# Patient Record
Sex: FEMALE | Race: WHITE | NOT HISPANIC OR LATINO | Employment: FULL TIME | URBAN - METROPOLITAN AREA
[De-identification: names, ages, dates, MRNs, and addresses within clinical notes are randomized per-mention and may not be internally consistent; named-entity substitution may affect disease eponyms.]

---

## 2021-02-02 ENCOUNTER — OFFICE VISIT (OUTPATIENT)
Dept: FAMILY MEDICINE | Facility: CLINIC | Age: 36
End: 2021-02-02

## 2021-02-02 VITALS
WEIGHT: 173 LBS | BODY MASS INDEX: 28.82 KG/M2 | SYSTOLIC BLOOD PRESSURE: 102 MMHG | OXYGEN SATURATION: 97 % | TEMPERATURE: 98.2 F | DIASTOLIC BLOOD PRESSURE: 68 MMHG | HEIGHT: 65 IN | HEART RATE: 76 BPM

## 2021-02-02 DIAGNOSIS — Z71.89 ACP (ADVANCE CARE PLANNING): ICD-10-CM

## 2021-02-02 DIAGNOSIS — Z01.818 PRE-OP EXAM: Primary | ICD-10-CM

## 2021-02-02 DIAGNOSIS — M67.431 GANGLION CYST OF WRIST, RIGHT: ICD-10-CM

## 2021-02-02 DIAGNOSIS — Z76.89 HEALTH CARE HOME: ICD-10-CM

## 2021-02-02 DIAGNOSIS — D75.839 THROMBOCYTOSIS: ICD-10-CM

## 2021-02-02 DIAGNOSIS — L70.9 ACNE, UNSPECIFIED ACNE TYPE: ICD-10-CM

## 2021-02-02 DIAGNOSIS — D72.829 LEUKOCYTOSIS, UNSPECIFIED TYPE: ICD-10-CM

## 2021-02-02 LAB
BUN SERPL-MCNC: 14 MG/DL (ref 7–25)
BUN/CREATININE RATIO: 15.9 (ref 6–22)
CALCIUM SERPL-MCNC: 9.7 MG/DL (ref 8.6–10.3)
CHLORIDE SERPLBLD-SCNC: 105.2 MMOL/L (ref 98–110)
CO2 SERPL-SCNC: 27.5 MMOL/L (ref 20–32)
CREAT SERPL-MCNC: 0.88 MG/DL (ref 0.6–1.3)
ERYTHROCYTE [DISTWIDTH] IN BLOOD BY AUTOMATED COUNT: 11.5 %
GLUCOSE SERPL-MCNC: 82 MG/DL (ref 60–99)
HCT VFR BLD AUTO: 39.3 % (ref 35–47)
HEMOGLOBIN: 13.2 G/DL (ref 11.7–15.7)
MCH RBC QN AUTO: 30.1 PG (ref 26–33)
MCHC RBC AUTO-ENTMCNC: 33.6 G/DL (ref 31–36)
MCV RBC AUTO: 89.6 FL (ref 78–100)
PLATELET COUNT - QUEST: 427 10^9/L (ref 150–375)
POTASSIUM SERPL-SCNC: 4.18 MMOL/L (ref 3.5–5.3)
RBC # BLD AUTO: 4.39 10*12/L (ref 3.8–5.2)
SODIUM SERPL-SCNC: 142.9 MMOL/L (ref 135–146)
WBC # BLD AUTO: 13.5 10*9/L (ref 4–11)

## 2021-02-02 PROCEDURE — 85027 COMPLETE CBC AUTOMATED: CPT | Performed by: PHYSICIAN ASSISTANT

## 2021-02-02 PROCEDURE — 99204 OFFICE O/P NEW MOD 45 MIN: CPT | Performed by: PHYSICIAN ASSISTANT

## 2021-02-02 PROCEDURE — 80048 BASIC METABOLIC PNL TOTAL CA: CPT | Performed by: PHYSICIAN ASSISTANT

## 2021-02-02 PROCEDURE — 36415 COLL VENOUS BLD VENIPUNCTURE: CPT | Performed by: PHYSICIAN ASSISTANT

## 2021-02-02 RX ORDER — ETONOGESTREL/ETHINYL ESTRADIOL .12-.015MG
RING, VAGINAL VAGINAL
COMMUNITY
Start: 2020-12-29 | End: 2021-04-14

## 2021-02-02 RX ORDER — SPIRONOLACTONE 50 MG/1
TABLET, FILM COATED ORAL
COMMUNITY
Start: 2021-01-19 | End: 2022-04-19

## 2021-02-02 SDOH — HEALTH STABILITY: MENTAL HEALTH: HOW MANY STANDARD DRINKS CONTAINING ALCOHOL DO YOU HAVE ON A TYPICAL DAY?: 1 OR 2

## 2021-02-02 SDOH — HEALTH STABILITY: MENTAL HEALTH: HOW OFTEN DO YOU HAVE A DRINK CONTAINING ALCOHOL?: 2-3 TIMES A WEEK

## 2021-02-02 SDOH — HEALTH STABILITY: MENTAL HEALTH: HOW OFTEN DO YOU HAVE 6 OR MORE DRINKS ON ONE OCCASION?: LESS THAN MONTHLY

## 2021-02-02 ASSESSMENT — MIFFLIN-ST. JEOR: SCORE: 1480.6

## 2021-02-02 NOTE — LETTER
[facility]  [address] [place] [address]  [address]  [facility] [address]  Phone: [phone]  Fax: [phone]  Primary Provider: Muna Walter  Pre-op Performing Provider: MUNA WALTER    PREOPERATIVE EVALUATION:  Today's date: 2/2/2021    Lynette Condon is a 35 year old female who presents for a preoperative evaluation.    Surgical Information:  Surgery/Procedure: Cystectomy in right wrist  Surgery Location: [facility]  Surgeon: Dr. Zavala  Surgery Date: 2/16/2021  Time of Surgery: TBD  Where patient plans to recover: At home alone  Fax number for surgical facility: [phone]    Type of Anesthesia Anticipated: to be determined    Subjective     HPI related to upcoming procedure:     1. No - Have you ever had a heart attack or stroke?  2. No - Have you ever had surgery on your heart or blood vessels, such as a stent, coronary (heart) bypass, or surgery on an artery in the head, neck, heart, or legs?  3. No - Do you have chest pain when you are physically active?  4. No - Do you have a history of heart failure?  5. No - Do you currently have a cold, bronchitis, or symptoms of other respiratory (head and chest) infections?  6. No - Do you have a cough, shortness of breath, or wheezing?  7. No - Do you or anyone in your family have a history of blood clots?  8. No - Do you or anyone in your family have a serious bleeding problem, such as long-lasting bleeding after surgeries or cuts?  9. No - Have you ever had anemia or been told to take iron pills?  10. No - Have you had any abnormal blood loss such as black, tarry or bloody stools, or abnormal vaginal bleeding?  11. No - Have you ever had a blood transfusion?  12. Yes - Are you willing to have a blood transfusion if it is medically needed before, during, or after your surgery?  13. No - Have you or anyone in your family ever had problems with anesthesia (sedation for surgery)?  14. No - Do you have sleep apnea,  excessive snoring, or daytime drowsiness?   15. No - Do you have any artifical heart valves or other implanted medical devices, such as a pacemaker, defibrillator, or continuous glucose monitor?  16. No - Do you have any artifical joints?  17. No - Are you allergic to latex?  18. No - Is there any chance that you may be pregnant?    Health Care Directive:  Patient does not have a Health Care Directive or Living Will: Discussed advance care planning with patient; information given to patient to review.    Status of Chronic Conditions:  See problem list for active medical problems.  Problems all longstanding and stable, except as noted/documented.  See ROS for pertinent symptoms related to these conditions.      Review of Systems  Constitutional, neuro, ENT, endocrine, pulmonary, cardiac, gastrointestinal, genitourinary, musculoskeletal, integument and psychiatric systems are negative, except as otherwise noted.    Patient Active Problem List    Diagnosis Date Noted     Health Care Home 02/02/2021     Priority: Low     ACP (advance care planning) 02/02/2021     Priority: Low      No past medical history on file.  Past Surgical History:   Procedure Laterality Date     LASIK Bilateral     age 21     OTHER SURGICAL HISTORY      egg harvesting x 2 for donation     PHOTOREFRACTIVE KERATECTOMY Bilateral     age 26     Current Outpatient Medications   Medication Sig Dispense Refill     NUVARING 0.12-0.015 MG/24HR vaginal ring PLACE 1 RING AND THEN REMOVE AFTER 21 DAYS THEN PLACE NEW RING AFTER 7 DAYS OUT       spironolactone (ALDACTONE) 50 MG tablet TAKE 1 TABLET BY MOUTH TWICE A DAY         No Known Allergies     Social History     Tobacco Use     Smoking status: Never Smoker     Smokeless tobacco: Never Used   Substance Use Topics     Alcohol use: Yes     Frequency: 2-3 times a week     Drinks per session: 1 or 2     Binge frequency: Less than monthly     Family History   Problem Relation Age of Onset     Hypertension  "Mother      Hypertension Father      Hypertension Brother      Breast Cancer Maternal Grandmother         70s     Prostate Cancer Maternal Grandfather      Breast Cancer Paternal Grandmother      History   Drug Use Unknown         Objective     /68 (BP Location: Left arm, Patient Position: Sitting, Cuff Size: Adult Large)   Pulse 76   Temp 98.2  F (36.8  C) (Oral)   Ht 1.651 m (5' 5\")   Wt 78.5 kg (173 lb)   LMP 01/07/2021   SpO2 97%   BMI 28.79 kg/m      Physical Exam    GENERAL APPEARANCE: healthy, alert and no distress     EYES: EOMI, PERRL     HENT: ear canals and TM's normal and nose and mouth without ulcers or lesions     NECK: no adenopathy, no asymmetry, masses, or scars and thyroid normal to palpation     RESP: lungs clear to auscultation - no rales, rhonchi or wheezes     CV: regular rates and rhythm, normal S1 S2, no S3 or S4 and no murmur, click or rub     ABDOMEN:  soft, nontender, no HSM or masses and bowel sounds normal     MS: extremities normal- no gross deformities noted, no evidence of inflammation in joints, FROM in all extremities.     SKIN: no suspicious lesions or rashes     NEURO: Normal strength and tone, sensory exam grossly normal, mentation intact and speech normal     PSYCH: mentation appears normal. and affect normal/bright     LYMPHATICS: No cervical adenopathy    No results for input(s): HGB, PLT, INR, NA, POTASSIUM, CR, A1C in the last 61961 hours.     Diagnostics:  Recent Results (from the past 240 hour(s))   Hemogram Platelet (BFP)    Collection Time: 02/02/21  4:53 PM   Result Value Ref Range    WBC 13.5 (A) 4.0 - 11 10*9/L    RBC Count 4.39 3.8 - 5.2 10*12/L    Hemoglobin 13.2 11.7 - 15.7 g/dL    Hematocrit 39.3 35.0 - 47.0 %    MCV 89.6 78 - 100 fL    MCH 30.1 26 - 33 pg    MCHC 33.6 31 - 36 g/dL    RDW 11.5 %    Platelet Count 427 (A) 150 - 375 10^9/L   Basic Metabolic Panel (BFP)    Collection Time: 02/02/21  5:13 PM   Result Value Ref Range    Carbon Dioxide 27.5 " 20 - 32 mmol/L    Creatinine 0.88 0.60 - 1.30 mg/dL    Glucose 82 60 - 99 mg/dL    Sodium 142.9 135 - 146 mmol/L    Potassium 4.18 3.5 - 5.3 mmol/L    Chloride 105.2 98 - 110 mmol/L    Urea Nitrogen 14 7 - 25 mg/dL    Calcium 9.7 8.6 - 10.3 mg/dL    BUN/Creatinine Ratio 15.9 6 - 22   HEMOGRAM PLATELET DIFF (BFP)    Collection Time: 02/09/21  4:34 PM   Result Value Ref Range    WBC 12.8 (A) 4.0 - 11 10*9/L    RBC Count 4.37 3.8 - 5.2 10*12/L    Hemoglobin 13.3 11.7 - 15.7 g/dL    Hematocrit 39.2 35.0 - 47.0 %    MCV 89.8 78 - 100 fL    MCH 30.4 26 - 33 pg    MCHC 33.9 31 - 36 g/dL    Platelet Count 405 (A) 150 - 375 10^9/L    % Granulocytes 54.6 %    % Lymphocytes 37.1 %    % Monocytes 8.3 %        Revised Cardiac Risk Index (RCRI):  The patient has the following serious cardiovascular risks for perioperative complications:   - No serious cardiac risks = 0 points     RCRI Interpretation: 0 points: Class I (very low risk - 0.4% complication rate)      Assessment & Plan     The proposed surgical procedure is considered INTERMEDIATE risk.    Pre-op exam  Ganglion cyst of wrist, right  - VENOUS COLLECTION  - Hemogram Platelet (BFP)  - Basic Metabolic Panel (BFP)  - VENOUS COLLECTION; Standing  - HEMOGRAM PLATELET DIFF (BFP); Standing  1. Seven days before surgery do not take Aspirin or any over-the-counter pain medications other than Tylenol.  TYLENOL is the safest pain pill to use before surgery because it does not affect your bleeding time. If tylenol is not sufficient for pain control talk to me or the surgeon and we will decide what is safe to use.    2. Do not eat anything after midnight  (lorena of the surgery) and nothing the morning of the surgery.    3. Medications: Hold medication on day of surgery until after surgery.     4. Follow all instructions given by the surgery team. They usually give out a packet. Read it and please follow it precisely. This helps surgical experience and outcomes.    5. If you have any  questions do not hesitate to call me or the surgeon/surgical team.    Acne, unspecified acne type    Elevated WBCs, platelets  Known history of this. Improved when rechecked on 1 week interval.     Health Care Home    ACP (advance care planning)    Risks and Recommendations:  The patient has the following additional risks and recommendations for perioperative complications:   - No identified additional risk factors other than previously addressed      RECOMMENDATION:  APPROVAL GIVEN to proceed with proposed procedure, without further diagnostic evaluation.    25 minutes spent on the date of the encounter doing chart review and patient visit     Signed Electronically by: Muna Walter PA-C    Copy of this evaluation report is provided to requesting physician.    Preop [facility] Preop Guidelines    Revised Cardiac Risk Index

## 2021-02-02 NOTE — LETTER
[facility]  [address] [place]  [address]  [facility] [address]  [phone]      February 2, 2021      Lynette Condon  [address]  [facility] [address]      EMERGENCY CARE PLAN  Presenting Problem Treatment Plan   Questions or concerns during clinic hours I will call the clinic directly:    [facility]  [address] , [address]  [place] [address]  [phone]   Questions or concerns outside clinic hours  I will call the 24 hour line at [phone]   Patient needs to schedule an appointment  I will call the  scheduling line at [phone]   Same day treatment   I will call the clinic first, then  urgent care and/or  express care if needed   Clinic Care Coordinators Dorene Chaudhary RN:  [phone]  [facility] Clinical Support Staff: [phone]    Crisis Services:  Behavioral or Mental Health [facility] ([facility] Providers)   [phone]   Emergency treatment--Immediately CALL 051

## 2021-02-02 NOTE — PROGRESS NOTES
[facility]  [address] [place] [address]  [address]  [facility] [address]  Phone: [phone]  Fax: [phone]  Primary Provider: Muna Walter  Pre-op Performing Provider: MUNA WALTER    PREOPERATIVE EVALUATION:  Today's date: 2/2/2021    Lynette Condon is a 35 year old female who presents for a preoperative evaluation.    Surgical Information:  Surgery/Procedure: Cystectomy in right wrist  Surgery Location: [facility]  Surgeon: Dr. Zavala  Surgery Date: 2/16/2021  Time of Surgery: TBD  Where patient plans to recover: At home alone  Fax number for surgical facility: [phone]    Type of Anesthesia Anticipated: to be determined    Subjective     HPI related to upcoming procedure:     1. No - Have you ever had a heart attack or stroke?  2. No - Have you ever had surgery on your heart or blood vessels, such as a stent, coronary (heart) bypass, or surgery on an artery in the head, neck, heart, or legs?  3. No - Do you have chest pain when you are physically active?  4. No - Do you have a history of heart failure?  5. No - Do you currently have a cold, bronchitis, or symptoms of other respiratory (head and chest) infections?  6. No - Do you have a cough, shortness of breath, or wheezing?  7. No - Do you or anyone in your family have a history of blood clots?  8. No - Do you or anyone in your family have a serious bleeding problem, such as long-lasting bleeding after surgeries or cuts?  9. No - Have you ever had anemia or been told to take iron pills?  10. No - Have you had any abnormal blood loss such as black, tarry or bloody stools, or abnormal vaginal bleeding?  11. No - Have you ever had a blood transfusion?  12. Yes - Are you willing to have a blood transfusion if it is medically needed before, during, or after your surgery?  13. No - Have you or anyone in your family ever had problems with anesthesia (sedation for surgery)?  14. No - Do you have sleep apnea,  excessive snoring, or daytime drowsiness?   15. No - Do you have any artifical heart valves or other implanted medical devices, such as a pacemaker, defibrillator, or continuous glucose monitor?  16. No - Do you have any artifical joints?  17. No - Are you allergic to latex?  18. No - Is there any chance that you may be pregnant?    Health Care Directive:  Patient does not have a Health Care Directive or Living Will: Discussed advance care planning with patient; information given to patient to review.    Status of Chronic Conditions:  See problem list for active medical problems.  Problems all longstanding and stable, except as noted/documented.  See ROS for pertinent symptoms related to these conditions.      Review of Systems  Constitutional, neuro, ENT, endocrine, pulmonary, cardiac, gastrointestinal, genitourinary, musculoskeletal, integument and psychiatric systems are negative, except as otherwise noted.    Patient Active Problem List    Diagnosis Date Noted     Health Care Home 02/02/2021     Priority: Low     ACP (advance care planning) 02/02/2021     Priority: Low      No past medical history on file.  Past Surgical History:   Procedure Laterality Date     LASIK Bilateral     age 21     OTHER SURGICAL HISTORY      egg harvesting x 2 for donation     PHOTOREFRACTIVE KERATECTOMY Bilateral     age 26     Current Outpatient Medications   Medication Sig Dispense Refill     NUVARING 0.12-0.015 MG/24HR vaginal ring PLACE 1 RING AND THEN REMOVE AFTER 21 DAYS THEN PLACE NEW RING AFTER 7 DAYS OUT       spironolactone (ALDACTONE) 50 MG tablet TAKE 1 TABLET BY MOUTH TWICE A DAY         No Known Allergies     Social History     Tobacco Use     Smoking status: Never Smoker     Smokeless tobacco: Never Used   Substance Use Topics     Alcohol use: Yes     Frequency: 2-3 times a week     Drinks per session: 1 or 2     Binge frequency: Less than monthly     Family History   Problem Relation Age of Onset     Hypertension  "Mother      Hypertension Father      Hypertension Brother      Breast Cancer Maternal Grandmother         70s     Prostate Cancer Maternal Grandfather      Breast Cancer Paternal Grandmother      History   Drug Use Unknown         Objective     /68 (BP Location: Left arm, Patient Position: Sitting, Cuff Size: Adult Large)   Pulse 76   Temp 98.2  F (36.8  C) (Oral)   Ht 1.651 m (5' 5\")   Wt 78.5 kg (173 lb)   LMP 01/07/2021   SpO2 97%   BMI 28.79 kg/m      Physical Exam    GENERAL APPEARANCE: healthy, alert and no distress     EYES: EOMI, PERRL     HENT: ear canals and TM's normal and nose and mouth without ulcers or lesions     NECK: no adenopathy, no asymmetry, masses, or scars and thyroid normal to palpation     RESP: lungs clear to auscultation - no rales, rhonchi or wheezes     CV: regular rates and rhythm, normal S1 S2, no S3 or S4 and no murmur, click or rub     ABDOMEN:  soft, nontender, no HSM or masses and bowel sounds normal     MS: extremities normal- no gross deformities noted, no evidence of inflammation in joints, FROM in all extremities.     SKIN: no suspicious lesions or rashes     NEURO: Normal strength and tone, sensory exam grossly normal, mentation intact and speech normal     PSYCH: mentation appears normal. and affect normal/bright     LYMPHATICS: No cervical adenopathy    No results for input(s): HGB, PLT, INR, NA, POTASSIUM, CR, A1C in the last 69878 hours.     Diagnostics:  Recent Results (from the past 240 hour(s))   Hemogram Platelet (BFP)    Collection Time: 02/02/21  4:53 PM   Result Value Ref Range    WBC 13.5 (A) 4.0 - 11 10*9/L    RBC Count 4.39 3.8 - 5.2 10*12/L    Hemoglobin 13.2 11.7 - 15.7 g/dL    Hematocrit 39.3 35.0 - 47.0 %    MCV 89.6 78 - 100 fL    MCH 30.1 26 - 33 pg    MCHC 33.6 31 - 36 g/dL    RDW 11.5 %    Platelet Count 427 (A) 150 - 375 10^9/L   Basic Metabolic Panel (BFP)    Collection Time: 02/02/21  5:13 PM   Result Value Ref Range    Carbon Dioxide 27.5 " 20 - 32 mmol/L    Creatinine 0.88 0.60 - 1.30 mg/dL    Glucose 82 60 - 99 mg/dL    Sodium 142.9 135 - 146 mmol/L    Potassium 4.18 3.5 - 5.3 mmol/L    Chloride 105.2 98 - 110 mmol/L    Urea Nitrogen 14 7 - 25 mg/dL    Calcium 9.7 8.6 - 10.3 mg/dL    BUN/Creatinine Ratio 15.9 6 - 22   HEMOGRAM PLATELET DIFF (BFP)    Collection Time: 02/09/21  4:34 PM   Result Value Ref Range    WBC 12.8 (A) 4.0 - 11 10*9/L    RBC Count 4.37 3.8 - 5.2 10*12/L    Hemoglobin 13.3 11.7 - 15.7 g/dL    Hematocrit 39.2 35.0 - 47.0 %    MCV 89.8 78 - 100 fL    MCH 30.4 26 - 33 pg    MCHC 33.9 31 - 36 g/dL    Platelet Count 405 (A) 150 - 375 10^9/L    % Granulocytes 54.6 %    % Lymphocytes 37.1 %    % Monocytes 8.3 %        Revised Cardiac Risk Index (RCRI):  The patient has the following serious cardiovascular risks for perioperative complications:   - No serious cardiac risks = 0 points     RCRI Interpretation: 0 points: Class I (very low risk - 0.4% complication rate)      Assessment & Plan     The proposed surgical procedure is considered INTERMEDIATE risk.    Pre-op exam  Ganglion cyst of wrist, right  - VENOUS COLLECTION  - Hemogram Platelet (BFP)  - Basic Metabolic Panel (BFP)  - VENOUS COLLECTION; Standing  - HEMOGRAM PLATELET DIFF (BFP); Standing  1. Seven days before surgery do not take Aspirin or any over-the-counter pain medications other than Tylenol.  TYLENOL is the safest pain pill to use before surgery because it does not affect your bleeding time. If tylenol is not sufficient for pain control talk to me or the surgeon and we will decide what is safe to use.    2. Do not eat anything after midnight  (lorena of the surgery) and nothing the morning of the surgery.    3. Medications: Hold medication on day of surgery until after surgery.     4. Follow all instructions given by the surgery team. They usually give out a packet. Read it and please follow it precisely. This helps surgical experience and outcomes.    5. If you have any  questions do not hesitate to call me or the surgeon/surgical team.    Acne, unspecified acne type    Elevated WBCs, platelets  Known history of this. Improved when rechecked on 1 week interval.     Health Care Home    ACP (advance care planning)    Risks and Recommendations:  The patient has the following additional risks and recommendations for perioperative complications:   - No identified additional risk factors other than previously addressed      RECOMMENDATION:  APPROVAL GIVEN to proceed with proposed procedure, without further diagnostic evaluation.    25 minutes spent on the date of the encounter doing chart review and patient visit     Signed Electronically by: Muna Walter PA-C    Copy of this evaluation report is provided to requesting physician.    Preop [facility] Preop Guidelines    Revised Cardiac Risk Index

## 2021-02-09 DIAGNOSIS — Z01.818 PRE-OP EXAM: ICD-10-CM

## 2021-02-09 DIAGNOSIS — M67.431 GANGLION CYST OF WRIST, RIGHT: ICD-10-CM

## 2021-02-09 LAB
% GRANULOCYTES: 54.6 %
HCT VFR BLD AUTO: 39.2 % (ref 35–47)
HEMOGLOBIN: 13.3 G/DL (ref 11.7–15.7)
LYMPHOCYTES NFR BLD AUTO: 37.1 %
MCH RBC QN AUTO: 30.4 PG (ref 26–33)
MCHC RBC AUTO-ENTMCNC: 33.9 G/DL (ref 31–36)
MCV RBC AUTO: 89.8 FL (ref 78–100)
MONOCYTES NFR BLD AUTO: 8.3 %
PLATELET COUNT - QUEST: 405 10^9/L (ref 150–375)
RBC # BLD AUTO: 4.37 10*12/L (ref 3.8–5.2)
WBC # BLD AUTO: 12.8 10*9/L (ref 4–11)

## 2021-02-09 PROCEDURE — 36415 COLL VENOUS BLD VENIPUNCTURE: CPT | Performed by: PHYSICIAN ASSISTANT

## 2021-02-09 PROCEDURE — 85025 COMPLETE CBC W/AUTO DIFF WBC: CPT | Performed by: PHYSICIAN ASSISTANT

## 2021-02-10 ENCOUNTER — TRANSFERRED RECORDS (OUTPATIENT)
Dept: FAMILY MEDICINE | Facility: CLINIC | Age: 36
End: 2021-02-10

## 2021-02-21 ENCOUNTER — HEALTH MAINTENANCE LETTER (OUTPATIENT)
Age: 36
End: 2021-02-21

## 2021-04-14 ENCOUNTER — OFFICE VISIT (OUTPATIENT)
Dept: FAMILY MEDICINE | Facility: CLINIC | Age: 36
End: 2021-04-14

## 2021-04-14 VITALS
DIASTOLIC BLOOD PRESSURE: 62 MMHG | SYSTOLIC BLOOD PRESSURE: 110 MMHG | TEMPERATURE: 97.4 F | WEIGHT: 172 LBS | HEART RATE: 85 BPM | BODY MASS INDEX: 28.66 KG/M2 | OXYGEN SATURATION: 98 % | HEIGHT: 65 IN

## 2021-04-14 DIAGNOSIS — Z12.4 SCREENING FOR CERVICAL CANCER: ICD-10-CM

## 2021-04-14 DIAGNOSIS — Z78.9 USES VAGINAL CONTRACEPTIVE RING: ICD-10-CM

## 2021-04-14 DIAGNOSIS — Z01.419 ENCOUNTER FOR GYNECOLOGICAL EXAMINATION: Primary | ICD-10-CM

## 2021-04-14 PROCEDURE — 99395 PREV VISIT EST AGE 18-39: CPT | Performed by: PHYSICIAN ASSISTANT

## 2021-04-14 PROCEDURE — 88142 CYTOPATH C/V THIN LAYER: CPT | Mod: 90 | Performed by: PHYSICIAN ASSISTANT

## 2021-04-14 PROCEDURE — 87624 HPV HI-RISK TYP POOLED RSLT: CPT | Mod: 90 | Performed by: PHYSICIAN ASSISTANT

## 2021-04-14 RX ORDER — ETONOGESTREL AND ETHINYL ESTRADIOL VAGINAL RING .015; .12 MG/D; MG/D
RING VAGINAL
Qty: 3 EACH | Refills: 3 | Status: SHIPPED | OUTPATIENT
Start: 2021-04-14 | End: 2022-03-23

## 2021-04-14 RX ORDER — CETIRIZINE HYDROCHLORIDE 10 MG/1
10 TABLET ORAL DAILY
COMMUNITY

## 2021-04-14 ASSESSMENT — MIFFLIN-ST. JEOR: SCORE: 1471.07

## 2021-04-14 NOTE — PROGRESS NOTES
Chief Complaint:  Physical Exam    SUBJECTIVE:   Lynette Condon is a 36 year old female presents for routine health maintenance.    Current concerns: Due for pap, not needing fasting labs. Would like refill of Nuvaring. Would prefer to avoid a daily pill, which is why this is a good option for her.     Takes spironolactone 50 mg daily for acne. Not needing refills at this time. No side effects other than mild dryness. Did have normal BMP for surgery 2/2021    Menses are regular. Using Nuvaring    Patient's last menstrual period was 04/01/2021.     Was last Pap smear normal: Yes  Due for mammogram:  No    Body mass index is 28.62 kg/m .    Present exercise habits:  3-5 times/week  Present dietary habits:  eats regular meals and follows a balanced nutrition diet    Calcium intake: 2 servings plus supplement   Vit D intake: is taking supplement    Is the patient a smoker? No  If yes, smoking cessation advised and counseling provided.     Cardiovascular risk factors: none    Over the past few weeks, have you felt down or depressed? Little interest or pleasure in doing things? No concerns    If in a relationship are there any Domestic violence concern: No    Last dental appointment:  this year  Last optical appointment:   more than 3 years ago    Was the patient born between [id number] and has not had Hep C testing?  Has not been tested, declines testing    I have reviewed the following histories: Past Medical History, Past Surgical History, Social History, Family History, Problem List, Medication List and Allergies    No past medical history on file.     Family History   Problem Relation Age of Onset     Hypertension Mother      Hypertension Father      Hypertension Brother      Breast Cancer Maternal Grandmother         70s     Prostate Cancer Maternal Grandfather      Breast Cancer Paternal Grandmother         70s     Social History     Socioeconomic History     Marital status: Single     Spouse name: Not on file      Number of children: Not on file     Years of education: Not on file     Highest education level: Not on file   Occupational History     Not on file   Social Needs     Financial resource strain: Not on file     Food insecurity     Worry: Not on file     Inability: Not on file     Transportation needs     Medical: Not on file     Non-medical: Not on file   Tobacco Use     Smoking status: Never Smoker     Smokeless tobacco: Never Used   Substance and Sexual Activity     Alcohol use: Yes     Frequency: 2-3 times a week     Drinks per session: 1 or 2     Binge frequency: Less than monthly     Drug use: Never     Sexual activity: Not Currently     Partners: Male   Lifestyle     Physical activity     Days per week: Not on file     Minutes per session: Not on file     Stress: Not on file   Relationships     Social connections     Talks on phone: Not on file     Gets together: Not on file     Attends Yazdanism service: Not on file     Active member of club or organization: Not on file     Attends meetings of clubs or organizations: Not on file     Relationship status: Not on file     Intimate partner violence     Fear of current or ex partner: Not on file     Emotionally abused: Not on file     Physically abused: Not on file     Forced sexual activity: Not on file   Other Topics Concern     Not on file   Social History Narrative     Not on file     Past Surgical History:   Procedure Laterality Date     LASIK Bilateral     age 21     OTHER SURGICAL HISTORY      egg harvesting x 2 for donation     PHOTOREFRACTIVE KERATECTOMY Bilateral     age 26     [place] EXCISION OF GANGLION CYST FOREARM Right 02/16/2021    TCO       ROS:  E/M: NEGATIVE for ear, nose, mouth and throat problems  R: NEGATIVE for significant/chronic cough or SOB  CV: NEGATIVE for chest pain or palpitations  GI: NEGATIVE for abdominal pain, chronic diarrhea or constipation  :  NEGATIVE for dysuria, hematuria or vaginal discharge. No sexual health concerns.  "      Current Outpatient Medications   Medication     cetirizine (ZYRTEC) 10 MG tablet     etonogestrel-ethinyl estradiol (NUVARING) 0.12-0.015 MG/24HR vaginal ring     spironolactone (ALDACTONE) 50 MG tablet     No current facility-administered medications for this visit.        Patient Active Problem List    Diagnosis Date Noted     Health Care Home 02/02/2021     Priority: Low     ACP (advance care planning) 02/02/2021     Priority: Low       OBJECTIVE:  /62 (BP Location: Left arm, Patient Position: Sitting, Cuff Size: Adult Regular)   Pulse 85   Temp 97.4  F (36.3  C) (Temporal)   Ht 1.651 m (5' 5\")   Wt 78 kg (172 lb)   LMP 04/01/2021   SpO2 98%   BMI 28.62 kg/m      General: 36 year old female who appears her stated age. Vital signs noted.  Head: Normocephalic  Eyes: pupils equal round reactive to light and accomodation, extra ocular movements intact  Ears: external canals and TMs free of abnormalities  Nose: patent, without mucosal abnormalities  Mouth and throat: without erythema or lesions of the mucosa  Neck: supple, without adenopathy or thyromegaly  Lungs: clear to auscultation, no wheezing or crackles  Breasts: skin without rash, no dominant mass, no nipple discharge, or axillary adenopathy  Cv: regular rate and rhythm, normal s1 and s2 without murmur or click  Abd: soft, non-tender, no masses, no hepatomegaly or splenomegaly.   (female): normal female external genitalia, normal urethral meatus, vaginal mucosa, normal cervix/adnexa/uterus without masses or discharge  Ms: normal muscle tone & symmetry  Skin: clear to inspection and with no palpable abnormalities.  Neuro: sensation and motor function grossly intact; cranial nerves without obvious abnormalities.    ASSESSMENT/PLAN:    1. Encounter for gynecological examination  Laisha is doing well today. Will refill Nuvaring for 1 year. [facility] pap today and [facility]     2. Uses vaginal contraceptive " "ring  - etonogestrel-ethinyl estradiol (NUVARING) 0.12-0.015 MG/24HR vaginal ring; PLACE 1 RING AND THEN REMOVE AFTER 21 DAYS THEN PLACE NEW RING AFTER 7 DAYS OUT  Dispense: 3 each; Refill: 3    3. Screening for cervical cancer  - ThinPrep Pap and HPV (mRNa E6/E7) HPV always run([facility])     reports that she has never smoked. She has never used smokeless tobacco.      Estimated body mass index is 28.62 kg/m  as calculated from the following:    Height as of this encounter: 1.651 m (5' 5\").    Weight as of this encounter: 78 kg (172 lb).  Weight management plan: Discussed healthy diet and exercise guidelines      Labs pending:        Pap smear  Meds Suggested:      Vitamin D       Calcium  Tests Recommended:      Regular Dental Examinations        Eye exam  Behavior Modifications:       Cardiovascular exercise 3 times per week--enough to get your Target Heart rate  Other recommendations:     BMI noted and discussed      Regular breast exam     Encouraged My Chart    Counseling Resources:  ATP IV Guidelines  Pooled Cohorts Equation Calculator  Breast Cancer Risk Calculator  FRAX Risk Assessment  ICSI Preventive Guidelines  Dietary Guidelines for Americans, 2010  [employer] MyPlate        Muna Paul PA-C  4/14/2021    "

## 2021-04-14 NOTE — NURSING NOTE
Laisha is here for a non fasting px.        Pre-visit Screening:  Immunizations:  up to date  Colonoscopy:  NA  Mammogram: NA  Asthma Action Test/Plan:  NA  PHQ9:  NA phq2 done today  GAD7:  NA  Questioned patient about current smoking habits Pt. has never smoked.  Ok to leave detailed message on voice mail for today's visit only Yes, phone # cell

## 2021-04-16 LAB
CLINICAL HISTORY - QUEST: NORMAL
COMMENT - QUEST: NORMAL
CYTOTECHNOLOGIST - QUEST: NORMAL
DESCRIPTIVE DIAGNOSIS - QUEST: NORMAL
HPV MRNA E6/E7: NOT DETECTED
LAST PAP DX - QUEST: NORMAL
LMP - QUEST: NORMAL
PREV BX DX - QUEST: NORMAL
SOURCE: NORMAL
STATEMENT OF ADEQUACY - QUEST: NORMAL

## 2021-05-04 ENCOUNTER — OFFICE VISIT (OUTPATIENT)
Dept: FAMILY MEDICINE | Facility: CLINIC | Age: 36
End: 2021-05-04

## 2021-05-04 VITALS
DIASTOLIC BLOOD PRESSURE: 68 MMHG | BODY MASS INDEX: 29.69 KG/M2 | HEART RATE: 84 BPM | WEIGHT: 178.4 LBS | TEMPERATURE: 97.6 F | SYSTOLIC BLOOD PRESSURE: 102 MMHG | OXYGEN SATURATION: 98 %

## 2021-05-04 DIAGNOSIS — K21.00 GASTROESOPHAGEAL REFLUX DISEASE WITH ESOPHAGITIS WITHOUT HEMORRHAGE: Primary | ICD-10-CM

## 2021-05-04 PROCEDURE — 99213 OFFICE O/P EST LOW 20 MIN: CPT | Performed by: PHYSICIAN ASSISTANT

## 2021-05-04 RX ORDER — OMEPRAZOLE 40 MG/1
40 CAPSULE, DELAYED RELEASE ORAL DAILY
Qty: 14 CAPSULE | Refills: 0 | Status: SHIPPED | OUTPATIENT
Start: 2021-05-04 | End: 2021-08-31

## 2021-05-04 NOTE — PROGRESS NOTES
CC: Chest pain, burning    History:  Starting 1 week ago, Laisha started to notice some difficulty swallowing. Was trying to swallow down piece of orange chicken and felt like it got stuck. Was eventually able to swallow it down, but still feels like it is in there. When she eats or drinks anything can feel that there is burning painful/sensation in the area where the chicken had felt like it was stuck. Around the top part of sternum. Took ibuprofen, and Pepto bismol which didn't effect pain/burning. Also getting a general intermittent burning, but has not noticed any pattern with meals, time of day. Doesn't hardly take any NSAIDS. Only 1 cup of caffeine daily.     No history of smoking, chewing. Family history in mom and grandma where they had reflux.     PMH, MEDICATIONS, ALLERGIES, SOCIAL AND FAMILY HISTORY in [facility] and reviewed by me personally.    ROS negative other than the symptoms noted above in the HPI.    Examination   /68 (BP Location: Left arm, Patient Position: Sitting, Cuff Size: Adult Large)   Pulse 84   Temp 97.6  F (36.4  C)   Wt 80.9 kg (178 lb 6.4 oz)   LMP 04/29/2021 (Exact Date)   SpO2 98%   BMI 29.69 kg/m       Constitutional: Sitting comfortably, in no acute distress. Vital signs noted  Eyes: pupils equal round reactive to light and accomodation, extra ocular   Mouth and throat: without erythema or lesions of the mucosa  Neck:  no adenopathy, trachea midline and normal to palpation, thyroid normal to palpation  Cardiovascular:  regular rate and rhythm, no murmurs, clicks, or gallops  Respiratory:  normal respiratory rate and rhythm, lungs clear to auscultation  SKIN: No jaundice/pallor/rash.   Psychiatric: mentation appears normal and affect normal/bright    A/P    ICD-10-CM    1. Gastroesophageal reflux disease with esophagitis without hemorrhage  K21.00 omeprazole (PRILOSEC) 40 MG DR capsule       DISCUSSION:  Discussed with Dr. Mejia, supervising physician. Recommended start on 2  week course of omeprazole 40 mg daily 30-60 minutes before meals. If improving and resolved can stop after 14 days. If not improving after 1 week, or it gets worse, asked her to contact me and would likely order EGD.    follow up visit: As needed    BRODY [facility]

## 2021-05-04 NOTE — NURSING NOTE
Laisha is here because since Thursday she feels like there is something in her chest, hurts when she swallows.    Pre-Visit Screening:  Immunizations:UTD  Colonoscopy:NA  Mammogram:NA  Asthma Action Test/Plan:NA  PHQ9:NA  GAD7:NA  Questioned patient about current smoking habits Pt.never smoked  OK to leave a detailed message on voice mail for today's visit yes, phone # [phone]

## 2021-08-31 ENCOUNTER — OFFICE VISIT (OUTPATIENT)
Dept: FAMILY MEDICINE | Facility: CLINIC | Age: 36
End: 2021-08-31

## 2021-08-31 VITALS
TEMPERATURE: 98.4 F | SYSTOLIC BLOOD PRESSURE: 122 MMHG | DIASTOLIC BLOOD PRESSURE: 80 MMHG | RESPIRATION RATE: 99 BRPM | HEART RATE: 89 BPM

## 2021-08-31 DIAGNOSIS — L03.115 CELLULITIS OF RIGHT LOWER EXTREMITY: Primary | ICD-10-CM

## 2021-08-31 PROCEDURE — 99213 OFFICE O/P EST LOW 20 MIN: CPT | Performed by: STUDENT IN AN ORGANIZED HEALTH CARE EDUCATION/TRAINING PROGRAM

## 2021-08-31 RX ORDER — CEPHALEXIN 500 MG/1
500 CAPSULE ORAL 3 TIMES DAILY
Qty: 21 CAPSULE | Refills: 0 | Status: SHIPPED | OUTPATIENT
Start: 2021-08-31 | End: 2021-08-31

## 2021-08-31 RX ORDER — DOXYCYCLINE HYCLATE 100 MG
100 TABLET ORAL 2 TIMES DAILY
Qty: 20 TABLET | Refills: 0 | Status: SHIPPED | OUTPATIENT
Start: 2021-08-31 | End: 2021-09-10

## 2021-08-31 NOTE — PROGRESS NOTES
Assessment & Plan     1. Cellulitis of right lower extremity  - doxycycline hyclate (VIBRA-TABS) 100 MG tablet; Take 1 tablet (100 mg) by mouth 2 times daily for 10 days  Dispense: 20 tablet; Refill: 0     Initially discussed keflex for cellulitis. However, after visit called patient to switch abx to doxy to cover possible early erythema migrans in addition to cellulitis. Pt in agreement, will not fill keflex.    Pt to follow-up via mychart with update, or return to care sooner with any worsening or new concerns.     Donavan Stevens MD, [facility]       Subjective     Lynette Condon is a 36 year old female who presents to clinic today for the following health issues:    HPI     Chief Complaint   Patient presents with     Rash     right medial thigh, near knee. notes itchy, redness, notes size growth. denies discharge or drainage.     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 Last TDAP 2014    Warm to touch. Itchy, not painful. No drainage. No systemic sx, feels completely well.         Objective    /80 (BP Location: Right arm, Patient Position: Sitting, Cuff Size: Adult Regular)   Pulse 89   Temp 98.4  F (36.9  C) (Oral)   Resp (!) 99   There is no height or weight on file to calculate BMI.  Physical Exam   Alert, NAD  NC/AT  Sclerae anicteric  Regular  Resp nonlabored  Skin warm and dry, ~6cm erythematous area with well demarcated border. Possible small central pustule, no fluctance or drainage  No focal neuro deficits. Speech intact. Normal gait.  Appropriate affect

## 2021-08-31 NOTE — NURSING NOTE
Chief Complaint   Patient presents with     Cellulitis     right medial thigh, near knee. notes itchy, redness, notes size growth. denies discharge or drainage.     Measures 6.5cm in diameter. Inflamed, white ring around redness. 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 Last TDAP 2014    OK to leave a detailed message on voice mail for today's visit YES, phone # [phone]

## 2021-08-31 NOTE — PATIENT INSTRUCTIONS
Looks like cellulitis after a bug bite    Start antibiotic 3x/day until they are gone    Can use benadryl cream for itching if needed    Let me know how you're doing in a few days, sooner if worsening

## 2021-09-26 ENCOUNTER — HEALTH MAINTENANCE LETTER (OUTPATIENT)
Age: 36
End: 2021-09-26

## 2022-03-22 ENCOUNTER — MYC MEDICAL ADVICE (OUTPATIENT)
Dept: FAMILY MEDICINE | Facility: CLINIC | Age: 37
End: 2022-03-22

## 2022-03-22 DIAGNOSIS — Z78.9 USES VAGINAL CONTRACEPTIVE RING: ICD-10-CM

## 2022-03-22 NOTE — TELEPHONE ENCOUNTER
Pt is due for an annual cpx/gyneological exam for further refills for Eluryng vaginal ring. Can come FASTING.  Will send a [employer] message to inform.

## 2022-03-23 RX ORDER — ETONOGESTREL AND ETHINYL ESTRADIOL VAGINAL RING .015; .12 MG/D; MG/D
RING VAGINAL
Qty: 1 EACH | Refills: 0 | Status: SHIPPED | OUTPATIENT
Start: 2022-03-23 | End: 2022-04-19

## 2022-03-23 NOTE — TELEPHONE ENCOUNTER
Lynette Condon is requesting a refill of:    Pending Prescriptions:                       Disp   Refills    etonogestrel-ethinyl estradiol (NUVARING)*1 each 0            Sig: PLACE 1 RING AND THEN REMOVE AFTER 21 DAYS THEN           PLACE NEW RING AFTER 7 DAYS OUT

## 2022-04-19 ENCOUNTER — TELEPHONE (OUTPATIENT)
Dept: FAMILY MEDICINE | Facility: CLINIC | Age: 37
End: 2022-04-19

## 2022-04-19 ENCOUNTER — OFFICE VISIT (OUTPATIENT)
Dept: FAMILY MEDICINE | Facility: CLINIC | Age: 37
End: 2022-04-19

## 2022-04-19 VITALS
HEIGHT: 65 IN | TEMPERATURE: 97.8 F | WEIGHT: 172 LBS | HEART RATE: 87 BPM | OXYGEN SATURATION: 96 % | DIASTOLIC BLOOD PRESSURE: 68 MMHG | SYSTOLIC BLOOD PRESSURE: 104 MMHG | BODY MASS INDEX: 28.66 KG/M2

## 2022-04-19 DIAGNOSIS — Z78.9 USES VAGINAL CONTRACEPTIVE RING: ICD-10-CM

## 2022-04-19 DIAGNOSIS — L70.0 ACNE VULGARIS: ICD-10-CM

## 2022-04-19 DIAGNOSIS — Z00.00 ENCOUNTER FOR GENERAL HEALTH EXAMINATION: Primary | ICD-10-CM

## 2022-04-19 LAB
BUN SERPL-MCNC: 14 MG/DL (ref 7–25)
BUN/CREATININE RATIO: 19.4 (ref 6–22)
CALCIUM SERPL-MCNC: 9.3 MG/DL (ref 8.6–10.3)
CHLORIDE SERPLBLD-SCNC: 102.4 MMOL/L (ref 98–110)
CO2 SERPL-SCNC: 28.7 MMOL/L (ref 20–32)
CREAT SERPL-MCNC: 0.72 MG/DL (ref 0.6–1.3)
GLUCOSE SERPL-MCNC: 84 MG/DL (ref 60–99)
POTASSIUM SERPL-SCNC: 4.33 MMOL/L (ref 3.5–5.3)
SODIUM SERPL-SCNC: 139.8 MMOL/L (ref 135–146)

## 2022-04-19 PROCEDURE — 80048 BASIC METABOLIC PNL TOTAL CA: CPT | Performed by: PHYSICIAN ASSISTANT

## 2022-04-19 PROCEDURE — 36415 COLL VENOUS BLD VENIPUNCTURE: CPT | Performed by: PHYSICIAN ASSISTANT

## 2022-04-19 PROCEDURE — 99395 PREV VISIT EST AGE 18-39: CPT | Performed by: PHYSICIAN ASSISTANT

## 2022-04-19 RX ORDER — SPIRONOLACTONE 50 MG/1
50 TABLET, FILM COATED ORAL DAILY
Qty: 90 TABLET | Refills: 3 | Status: SHIPPED | OUTPATIENT
Start: 2022-04-19 | End: 2023-04-24

## 2022-04-19 RX ORDER — ETONOGESTREL/ETHINYL ESTRADIOL .12-.015MG
RING, VAGINAL VAGINAL
Qty: 3 EACH | Refills: 3 | Status: SHIPPED | OUTPATIENT
Start: 2022-04-19 | End: 2022-04-21

## 2022-04-19 RX ORDER — CHLORHEXIDINE GLUCONATE 4 %
LIQUID (ML) TOPICAL
COMMUNITY
Start: 2022-04-19

## 2022-04-19 NOTE — PROGRESS NOTES
Chief Complaint:  Physical Exam    SUBJECTIVE:   Lynette Condon is a 37 year old female presents for routine health maintenance.    Current concerns:   Contraception:  Stopped Nuvaring this past month. Feels like mood has been slightly better. Will pay attention to this when she restarts, and if a concern, may need to consider other options. Also got switched from brand Nuvaring to generic and acne seemed to worsen after this change.     Acne:  Has been taking spironolactone 50 mg daily for acne. Feels like this is working well. No side effects.     Menses are regular    Patient's last menstrual period was 03/24/2022.    Was last Pap smear normal: Yes  Due for mammogram:  No    Body mass index is 28.62 kg/m .    Present exercise habits:  3 times/week  Present dietary habits:  eats regular meals and follows a balanced nutrition diet    Calcium intake: 1-2 servings, multivitamin  Vit D intake: is not taking supplement    Is the patient a smoker? No  If yes, smoking cessation advised and counseling provided.     Cardiovascular risk factors: none    Over the past few weeks, have you felt down or depressed? Little interest or pleasure in doing things? No concerns     If in a relationship are there any Domestic violence concern: No    Last dental appointment:  this year  Last optical appointment:   more than 3 years ago    Was the patient born between [id number] and has not had Hep C testing?  Has not been tested, declines testing    I have reviewed the following histories: Past Medical History, Past Surgical History, Social History, Family History, Problem List, Medication List and Allergies    No past medical history on file.  Family History   Problem Relation Age of Onset     Hypertension Mother      Hypertension Father      Hypertension Brother      Breast Cancer Maternal Grandmother         70s     Prostate Cancer Maternal Grandfather      Breast Cancer Paternal Grandmother         70s     Social History      Socioeconomic History     Marital status: Single     Spouse name: Not on file     Number of children: Not on file     Years of education: Not on file     Highest education level: Not on file   Occupational History     Not on file   Tobacco Use     Smoking status: Never Smoker     Smokeless tobacco: Never Used   Substance and Sexual Activity     Alcohol use: Yes     Alcohol/week: 3.0 standard drinks     Types: 3 Standard drinks or equivalent per week     Drug use: Never     Sexual activity: Not Currently     Partners: Male   Other Topics Concern     Not on file   Social History Narrative     Not on file     Social Determinants of Health     Financial Resource Strain: Not on file   Food Insecurity: Not on file   Transportation Needs: Not on file   Physical Activity: Not on file   Stress: Not on file   Social Connections: Not on file   Intimate Partner Violence: Not on file   Housing Stability: Not on file     Past Surgical History:   Procedure Laterality Date     LASIK Bilateral     age 21     OTHER SURGICAL HISTORY      egg harvesting x 2 for donation     PHOTOREFRACTIVE KERATECTOMY Bilateral     age 26     [place] EXCISION OF GANGLION CYST FOREARM Right 02/16/2021    TCO       ROS:  E/M: NEGATIVE for ear, nose, mouth and throat problems  R: NEGATIVE for significant/chronic cough or SOB  CV: NEGATIVE for chest pain or palpitations  GI: NEGATIVE for abdominal pain, chronic diarrhea or constipation  :  NEGATIVE for dysuria, hematuria or vaginal discharge. No sexual health concerns.       Current Outpatient Medications   Medication     cetirizine (ZYRTEC) 10 MG tablet     Multiple Vitamins-Minerals (WOMENS MULTIVITAMIN) TABS     NUVARING 0.12-0.015 MG/24HR vaginal ring     spironolactone (ALDACTONE) 50 MG tablet     No current facility-administered medications for this visit.       Patient Active Problem List    Diagnosis Date Noted     Health Care Home 02/02/2021     Priority: Low     ACP (advance care planning)  "02/02/2021     Priority: Low       OBJECTIVE:  /68 (BP Location: Right arm, Patient Position: Sitting, Cuff Size: Adult Large)   Pulse 87   Temp 97.8  F (36.6  C) (Temporal)   Ht 1.651 m (5' 5\")   Wt 78 kg (172 lb)   LMP 03/24/2022   SpO2 96%   BMI 28.62 kg/m      General: 37 year old female who appears her stated age. Vital signs noted  Head: Normocephalic  Eyes: pupils equal round reactive to light and accomodation, extra ocular movements intact  Ears: external canals and TMs free of abnormalities  Nose: patent, without mucosal abnormalities  Mouth and throat: without erythema or lesions of the mucosa  Neck: supple, without adenopathy or thyromegaly  Lungs: clear to auscultation, no wheezing or crackles  Breasts: Pt declined.  Cv: regular rate and rhythm, normal s1 and s2 without murmur or click  Abd: soft, non-tender, no masses, no hepatomegaly or splenomegaly.   (female): Pt declined.  Ms: normal muscle tone & symmetry  Skin: clear to inspection and with no palpable abnormalities.  Neuro: sensation and motor function grossly intact; cranial nerves without obvious abnormalities.    ASSESSMENT/PLAN:    Encounter for general health examination  Laisha is doing well today. Not fasting. Will update BMP for spironolactone, and send Novitashart. Refilled both for 1 year without change. Asked for brand name Nuvaring for worsening acne.     Uses vaginal contraceptive ring  - NUVARING 0.12-0.015 MG/24HR vaginal ring; PLACE 1 RING AND THEN REMOVE AFTER 21 DAYS THEN PLACE NEW RING AFTER 7 DAYS OUT    Acne vulgaris  - spironolactone (ALDACTONE) 50 MG tablet; Take 1 tablet (50 mg) by mouth daily  - VENOUS COLLECTION  - Basic Metabolic Panel (BFP)     reports that she has never smoked. She has never used smokeless tobacco.    Estimated body mass index is 28.62 kg/m  as calculated from the following:    Height as of this encounter: 1.651 m (5' 5\").    Weight as of this encounter: 78 kg (172 lb).  Weight management " plan: Discussed healthy diet and exercise guidelines    Meds Suggested:      Vitamin D       Calcium  Tests Recommended:      Regular Dental Examinations        Eye exam  Behavior Modifications:       Cardiovascular exercise 3 times per week--enough to get your Target Heart rate  Other recommendations:    Health Care directive     BMI noted and discussed      Regular breast exam     Encouraged My Chart    Counseling Resources:  ATP IV Guidelines  Pooled Cohorts Equation Calculator  Breast Cancer Risk Calculator  FRAX Risk Assessment  ICSI Preventive Guidelines  Dietary Guidelines for Americans, 2010  [employer]'s MyPlate            Muna Paul PA-C  4/19/2022

## 2022-04-19 NOTE — NURSING NOTE
Chief Complaint   Patient presents with     Physical     Non fasting         Pre-visit Screening:  Immunizations:  up to date  Colonoscopy:  NA  Mammogram: NA  Asthma Action Test/Plan:  NA  PHQ9:  NA phq2 done today  GAD7:  NA  Questioned patient about current smoking habits Pt. has never smoked.  Ok to leave detailed message on voice mail for today's visit only Yes, phone # [phone]

## 2022-04-20 ENCOUNTER — TELEPHONE (OUTPATIENT)
Dept: FAMILY MEDICINE | Facility: CLINIC | Age: 37
End: 2022-04-20

## 2022-04-20 DIAGNOSIS — Z78.9 USES VAGINAL CONTRACEPTIVE RING: ICD-10-CM

## 2022-04-21 RX ORDER — ETONOGESTREL AND ETHINYL ESTRADIOL VAGINAL RING .015; .12 MG/D; MG/D
RING VAGINAL
Qty: 3 EACH | Refills: 3 | Status: SHIPPED | OUTPATIENT
Start: 2022-04-21 | End: 2023-05-31

## 2022-04-21 NOTE — TELEPHONE ENCOUNTER
Please inform pt we attemped to get Brand name covered and were denied. Sent through generic nuvaring.

## 2022-04-27 NOTE — TELEPHONE ENCOUNTER
Central Prior Authorization Team   Phone: [phone]      PRIOR AUTHORIZATION DENIED    Medication: NUVARING 0.12-0.015 MG/24HR vaginal ring - EPA DENIED    Denial Date: 4/19/2022    Denial Rational:   Based on the policy and the information we received your request is denied. We did not  receive documentation that you meet the criteria outlined above.  Formulary alternatives are: ethinyl estradiol-etonogestrel, Annovera. (Requirement: 3 in  a class with 3 or more alternatives, 2 in a class with 2 alternatives, or 1 in a class with  only 1 alternative.)      Appeal Information: If the Provider/Patient would like to appeal this denial, please provide a letter of medical necessity explaining why Preferred Formulary medications are not appropriate in the treatment of the patient's condition; along with any previous therapies tried/failed.    Once completed, please route back to me directly: Zafar Jones

## 2022-11-10 ENCOUNTER — TRANSFERRED RECORDS (OUTPATIENT)
Dept: FAMILY MEDICINE | Facility: CLINIC | Age: 37
End: 2022-11-10

## 2022-11-17 NOTE — PROGRESS NOTES
"  Assessment & Plan     Acute bacterial sinusitis-patient appears to have bacterial sinus infection based on URI followed by sinus symptoms and length of symptoms. Will treat accordingly  Increase fluids, rest, continue ibuprofen/Sudafed  - amoxicillin-clavulanate (AUGMENTIN) 875-125 MG tablet  Dispense: 14 tablet; Refill: 0  - benzonatate (TESSALON) 100 MG capsule  Dispense: 20 capsule; Refill: 0        Follow up if symptoms persist    No follow-ups on file.    David Landers, BRODY  [facility]    Subjective   Laisha is a 37 year old, presenting for the following health issues:  Cough (Pt has bee coughing for the past four  weeks - ), Ear Problem (Both ear hurts ), and Pharyngitis (Pt has a sore trough-  Pt did a fever couple weeks ago  - Pt did two home test for COVID-19 - they were negative )      HPI     Started as URI, then worsened to sinus infection/bronchitis    Acute Illness  Acute illness concerns: Chest congestion, sinus pressure/pain  Onset/Duration: 4 weeks  Symptoms:  Fever: No  Chills/Sweats: YES  Headache (location?): YES  Sinus Pressure: YES  Conjunctivitis:  No  Ear Pain: YES: bilateral  Rhinorrhea: YES  Congestion: YES  Sore Throat: No  Cough: YES-productive of yellow sputum  Wheeze: No  Decreased Appetite: YES  Nausea: No  Vomiting: No  Diarrhea: No  Dysuria/Freq.: No  Dysuria or Hematuria: No  Fatigue/Achiness: YES  Sick/Strep Exposure: No  Therapies tried and outcome: Sudafed, Theraflu, ibuprofen    Review of Systems   Constitutional, HEENT, cardiovascular, pulmonary, gi and gu systems are negative, except as otherwise noted.      Objective    /70 (BP Location: Right arm, Patient Position: Sitting, Cuff Size: Adult Regular)   Pulse 102   Temp 97.5  F (36.4  C) (Tympanic)   Resp 16   Ht 1.651 m (5' 5\")   Wt 78 kg (172 lb)   LMP 11/02/2022   SpO2 99%   BMI 28.62 kg/m    Body mass index is 28.62 kg/m .  Physical Exam   GENERAL: healthy, alert and no distress  HENT: " unable to view TM YANG due to small canals and hair, nose and mouth without ulcers or lesions  NECK: no adenopathy, no asymmetry, masses, or scars and thyroid normal to palpation  RESP: lungs clear to auscultation - no rales, rhonchi or wheezes  CV: regular rate and rhythm, normal S1 S2, no S3 or S4, no murmur, click or rub, no peripheral edema and peripheral pulses strong  ABDOMEN: soft, nontender, no hepatosplenomegaly, no masses and bowel sounds normal  MS: no gross musculoskeletal defects noted, no edema  NEURO: Normal strength and tone, mentation intact and speech normal  PSYCH: mentation appears normal, affect normal/bright

## 2022-11-18 ENCOUNTER — OFFICE VISIT (OUTPATIENT)
Dept: FAMILY MEDICINE | Facility: CLINIC | Age: 37
End: 2022-11-18

## 2022-11-18 VITALS
RESPIRATION RATE: 16 BRPM | WEIGHT: 172 LBS | DIASTOLIC BLOOD PRESSURE: 70 MMHG | BODY MASS INDEX: 28.66 KG/M2 | SYSTOLIC BLOOD PRESSURE: 112 MMHG | HEIGHT: 65 IN | TEMPERATURE: 97.5 F | HEART RATE: 102 BPM | OXYGEN SATURATION: 99 %

## 2022-11-18 DIAGNOSIS — J01.90 ACUTE BACTERIAL SINUSITIS: Primary | ICD-10-CM

## 2022-11-18 DIAGNOSIS — B96.89 ACUTE BACTERIAL SINUSITIS: Primary | ICD-10-CM

## 2022-11-18 PROCEDURE — 99213 OFFICE O/P EST LOW 20 MIN: CPT | Performed by: PHYSICIAN ASSISTANT

## 2022-11-18 RX ORDER — BENZONATATE 100 MG/1
100 CAPSULE ORAL 3 TIMES DAILY PRN
Qty: 20 CAPSULE | Refills: 0 | Status: SHIPPED | OUTPATIENT
Start: 2022-11-18 | End: 2023-05-31

## 2022-11-18 NOTE — NURSING NOTE
Chief Complaint   Patient presents with     Cough     Pt has bee coughing for the past four  weeks -      Ear Problem     Both ear hurts      Pharyngitis     Pt has a sore trough-  Pt did a fever couple weeks ago  - Pt did two home test for COVID-19 - they were negative    Pre-visit Screening:  Immunizations:  not up to date -   Colonoscopy: N/A  Mammogram: is up to date  Asthma Action Test/Plan: Pt doesn't have asthma   PHQ9:  Is up to date   GAD7:  Is up to date   Questioned patient about current smoking habits Pt. has never smoked.  Ok to leave detailed message on voice mail for today's visit only Yes , phone #   [phone]

## 2023-01-14 ENCOUNTER — HEALTH MAINTENANCE LETTER (OUTPATIENT)
Age: 38
End: 2023-01-14

## 2023-04-24 ENCOUNTER — MYC MEDICAL ADVICE (OUTPATIENT)
Dept: FAMILY MEDICINE | Facility: CLINIC | Age: 38
End: 2023-04-24

## 2023-04-24 DIAGNOSIS — L70.0 ACNE VULGARIS: ICD-10-CM

## 2023-04-24 RX ORDER — SPIRONOLACTONE 50 MG/1
50 TABLET, FILM COATED ORAL DAILY
Qty: 30 TABLET | Refills: 0 | Status: SHIPPED | OUTPATIENT
Start: 2023-04-24 | End: 2023-05-31

## 2023-04-24 NOTE — TELEPHONE ENCOUNTER
Pt scheduled appt for 05/31/23.    Lynette Condon is requesting a refill of:    Pending Prescriptions:                       Disp   Refills    spironolactone (ALDACTONE) 50 MG tablet   30 tab*0            Sig: Take 1 tablet (50 mg) by mouth daily

## 2023-05-31 ENCOUNTER — OFFICE VISIT (OUTPATIENT)
Dept: FAMILY MEDICINE | Facility: CLINIC | Age: 38
End: 2023-05-31

## 2023-05-31 VITALS
SYSTOLIC BLOOD PRESSURE: 110 MMHG | WEIGHT: 175 LBS | DIASTOLIC BLOOD PRESSURE: 68 MMHG | BODY MASS INDEX: 29.16 KG/M2 | TEMPERATURE: 97.2 F | HEIGHT: 65 IN | HEART RATE: 79 BPM | OXYGEN SATURATION: 99 %

## 2023-05-31 DIAGNOSIS — Z78.9 USES VAGINAL CONTRACEPTIVE RING: ICD-10-CM

## 2023-05-31 DIAGNOSIS — L70.0 ACNE VULGARIS: ICD-10-CM

## 2023-05-31 DIAGNOSIS — D75.839 THROMBOCYTOSIS, UNSPECIFIED: ICD-10-CM

## 2023-05-31 DIAGNOSIS — Z13.228 SCREENING FOR METABOLIC DISORDER: ICD-10-CM

## 2023-05-31 DIAGNOSIS — Z13.220 SCREENING FOR LIPID DISORDERS: ICD-10-CM

## 2023-05-31 DIAGNOSIS — Z01.419 ENCOUNTER FOR GYNECOLOGICAL EXAMINATION WITHOUT ABNORMAL FINDING: Primary | ICD-10-CM

## 2023-05-31 LAB
BUN SERPL-MCNC: 9 MG/DL (ref 7–25)
BUN/CREATININE RATIO: 10 (ref 6–32)
CALCIUM SERPL-MCNC: 9.5 MG/DL (ref 8.6–10.3)
CHLORIDE SERPLBLD-SCNC: 101.7 MMOL/L (ref 98–110)
CHOLEST SERPL-MCNC: 153 MG/DL (ref 0–199)
CHOLEST/HDLC SERPL: 2 {RATIO} (ref 0–5)
CO2 SERPL-SCNC: 26.1 MMOL/L (ref 20–32)
CREAT SERPL-MCNC: 0.9 MG/DL (ref 0.6–1.3)
ERYTHROCYTE [DISTWIDTH] IN BLOOD BY AUTOMATED COUNT: 12 %
GLUCOSE SERPL-MCNC: 98 MG/DL (ref 60–99)
HCT VFR BLD AUTO: 38 % (ref 35–47)
HDLC SERPL-MCNC: 70 MG/DL (ref 40–150)
HEMOGLOBIN: 12.4 G/DL (ref 11.7–15.7)
LDLC SERPL CALC-MCNC: 57 MG/DL (ref 0–130)
MCH RBC QN AUTO: 30 PG (ref 26–33)
MCHC RBC AUTO-ENTMCNC: 32.6 G/DL (ref 31–36)
MCV RBC AUTO: 91.9 FL (ref 78–100)
PLATELET COUNT - QUEST: 389 10^9/L (ref 150–375)
POTASSIUM SERPL-SCNC: 4.6 MMOL/L (ref 3.5–5.3)
RBC # BLD AUTO: 4.14 10*12/L (ref 3.8–5.2)
SODIUM SERPL-SCNC: 135.7 MMOL/L (ref 135–146)
TRIGL SERPL-MCNC: 129 MG/DL (ref 0–149)
WBC # BLD AUTO: 12.5 10*9/L (ref 4–11)

## 2023-05-31 PROCEDURE — 80048 BASIC METABOLIC PNL TOTAL CA: CPT | Performed by: PHYSICIAN ASSISTANT

## 2023-05-31 PROCEDURE — 36415 COLL VENOUS BLD VENIPUNCTURE: CPT | Performed by: PHYSICIAN ASSISTANT

## 2023-05-31 PROCEDURE — 99395 PREV VISIT EST AGE 18-39: CPT | Performed by: PHYSICIAN ASSISTANT

## 2023-05-31 PROCEDURE — 85027 COMPLETE CBC AUTOMATED: CPT | Performed by: PHYSICIAN ASSISTANT

## 2023-05-31 PROCEDURE — 80061 LIPID PANEL: CPT | Performed by: PHYSICIAN ASSISTANT

## 2023-05-31 RX ORDER — SPIRONOLACTONE 50 MG/1
50 TABLET, FILM COATED ORAL DAILY
Qty: 90 TABLET | Refills: 3 | Status: SHIPPED | OUTPATIENT
Start: 2023-05-31 | End: 2024-05-16

## 2023-05-31 RX ORDER — ETONOGESTREL AND ETHINYL ESTRADIOL VAGINAL RING .015; .12 MG/D; MG/D
1 RING VAGINAL
Qty: 3 EACH | Refills: 3 | Status: SHIPPED | OUTPATIENT
Start: 2023-05-31 | End: 2024-05-16

## 2023-05-31 NOTE — NURSING NOTE
Chief Complaint   Patient presents with     Physical     Fasting cpx         Pre-visit Screening:  Immunizations:  up to date  Colonoscopy:  NA  Mammogram: NA  Asthma Action Test/Plan:  NA  PHQ9:  NA  GAD7:  NA  Questioned patient about current smoking habits Pt. has never smoked.  Ok to leave detailed message on voice mail for today's visit only Yes, phone # [phone]

## 2023-05-31 NOTE — PROGRESS NOTES
Chief Complaint:  Physical Exam    SUBJECTIVE:   Lynette Condon is a 38 year old female presents for routine health maintenance.    Current concerns:   Contraception:  Restarted generic Nuvaring last year after taking 1 month off. Restarted and could tell lowered mood slightly but not bothersome. Would like to continue using.      Acne:  Takes spironolactone daily. Works well to control acne. No side effects.     Elevated platelets, WBCs:  Previous clinic detected 2019, and has been stable.     Menses are regular    Patient's last menstrual period was 05/11/2023.    Was last Pap smear normal: Yes  Due for mammogram:  No    Body mass index is 29.12 kg/m .    Present exercise habits:  3-5 times/week  Present dietary habits:  eats regular meals and follows a balanced nutrition diet    Calcium intake: 1-2 servings plus MV  Vit D intake: is taking supplement    Is the patient a smoker? No  If yes, smoking cessation advised and counseling provided.     Cardiovascular risk factors: none    Over the past few weeks, have you felt down or depressed? Little interest or pleasure in doing things? No concerns.     If in a relationship are there any Domestic violence concern: No    Last dental appointment:  this year  Last optical appointment:  3 years ago    Was the patient born between [birth year] and has not had Hep C testing?  Has not been tested, declines testing    I have reviewed the following histories: Past Medical History, Past Surgical History, Social History, Family History, Problem List, Medication List and Allergies    No past medical history on file.  Family History   Problem Relation Age of Onset     Hypertension Mother      Hypertension Father      Hypertension Brother      Breast Cancer Maternal Grandmother         70s     Prostate Cancer Maternal Grandfather      Breast Cancer Paternal Grandmother         70s     Social History     Socioeconomic History     Marital status: Single     Spouse name: Not on file      Number of children: Not on file     Years of education: Not on file     Highest education level: Not on file   Occupational History     Not on file   Tobacco Use     Smoking status: Never     Passive exposure: Never     Smokeless tobacco: Never   Vaping Use     Vaping status: Not on file   Substance and Sexual Activity     Alcohol use: Yes     Alcohol/week: 3.0 standard drinks of alcohol     Types: 3 Standard drinks or equivalent per week     Drug use: Never     Sexual activity: Not Currently     Partners: Male   Other Topics Concern     Not on file   Social History Narrative     Not on file     Social Determinants of Health     Financial Resource Strain: Not on file   Food Insecurity: Not on file   Transportation Needs: Not on file   Physical Activity: Not on file   Stress: Not on file   Social Connections: Not on file   Intimate Partner Violence: Not on file   Housing Stability: Not on file     Past Surgical History:   Procedure Laterality Date     LASIK Bilateral     age 21     OTHER SURGICAL HISTORY      egg harvesting x 2 for donation     PHOTOREFRACTIVE KERATECTOMY Bilateral     age 26     MI EXCISION OF GANGLION CYST FOREARM Right 02/16/2021    TCO       ROS:  E/M: NEGATIVE for ear, nose, mouth and throat problems  R: NEGATIVE for significant/chronic cough or SOB  CV: NEGATIVE for chest pain or palpitations  GI: NEGATIVE for abdominal pain, chronic diarrhea or constipation  :  NEGATIVE for dysuria, hematuria or vaginal discharge. No sexual health concerns.       Current Outpatient Medications   Medication     cetirizine (ZYRTEC) 10 MG tablet     etonogestrel-ethinyl estradiol (NUVARING) 0.12-0.015 MG/24HR vaginal ring     Multiple Vitamins-Minerals (WOMENS MULTIVITAMIN) TABS     spironolactone (ALDACTONE) 50 MG tablet     No current facility-administered medications for this visit.       Patient Active Problem List    Diagnosis Date Noted     Health Care Home 02/02/2021     Priority: Low     ACP (advance  "care planning) 02/02/2021     Priority: Low         OBJECTIVE:  /68 (BP Location: Left arm, Patient Position: Sitting, Cuff Size: Adult Large)   Pulse 79   Temp 97.2  F (36.2  C) (Temporal)   Ht 1.651 m (5' 5\")   Wt 79.4 kg (175 lb)   LMP 05/11/2023   SpO2 99%   BMI 29.12 kg/m      General: 38 year old female who appears her stated age. Vital signs noted.  Head: Normocephalic  Eyes: pupils equal round reactive to light and accomodation, extra ocular movements intact  Ears: external canals and TMs free of abnormalities  Nose: patent, without mucosal abnormalities  Mouth and throat: without erythema or lesions of the mucosa  Neck: supple, without adenopathy or thyromegaly  Lungs: clear to auscultation, no wheezing or crackles  Breasts: skin without rash, no dominant mass, no nipple discharge, or axillary adenopathy  Cv: regular rate and rhythm, normal s1 and s2 without murmur or click  Abd: soft, non-tender, no masses, no hepatomegaly or splenomegaly.   (female): normal female external genitalia, normal urethral meatus, vaginal mucosa, normal cervix/adnexa/uterus without masses or discharge, other than small amount of sanguinous discharge (due to start menses)  Ms: normal muscle tone & symmetry  Skin: clear to inspection and with no palpable abnormalities.  Neuro: sensation and motor function grossly intact; cranial nerves without obvious abnormalities.    ASSESSMENT/PLAN:    Encounter for gynecological examination without abnormal finding  Laisha is doing well today. Will update fasting labs, CBC and send Mychart. Refilled both medicaitons without change for 1 year.     Uses vaginal contraceptive ring  - etonogestrel-ethinyl estradiol (NUVARING) 0.12-0.015 MG/24HR vaginal ring; Insert one (1) ring vaginally and leave in place for 3 consecutive weeks (21 days), then remove for 1 week.  - VENOUS COLLECTION    Acne vulgaris  - spironolactone (ALDACTONE) 50 MG tablet; Take 1 tablet (50 mg) by mouth " "daily    Screening for metabolic disorder  - VENOUS COLLECTION  - Basic Metabolic Panel (BFP)    Screening for lipid disorders  - VENOUS COLLECTION  - Lipid Panel (BFP)    Thrombocytosis, unspecified  - Hemogram Platelet (BFP)     reports that she has never smoked. She has never been exposed to tobacco smoke. She has never used smokeless tobacco.    Estimated body mass index is 29.12 kg/m  as calculated from the following:    Height as of this encounter: 1.651 m (5' 5\").    Weight as of this encounter: 79.4 kg (175 lb).  Weight management plan: Discussed healthy diet and exercise guidelines    Labs pending:      Fasting glucose      Fasting lipids  Meds Suggested:      Vitamin D       Calcium  Tests Recommended:      Regular Dental Examinations        Eye exam  Behavior Modifications:       Cardiovascular exercise 3 times per week--enough to get your Target Heart rate  Other recommendations:    Health Care directive     BMI noted and discussed      Regular breast exam     Encouraged My Chart    Counseling Resources:  ATP IV Guidelines  Pooled Cohorts Equation Calculator  Breast Cancer Risk Calculator  FRAX Risk Assessment  ICSI Preventive Guidelines  Dietary Guidelines for Americans, 2010  BAC ON TRAC's MyPlate        Muna Paul PA-C  5/31/2023    "

## 2024-05-15 PROBLEM — L70.0 ACNE VULGARIS: Status: ACTIVE | Noted: 2024-05-15

## 2024-05-16 ENCOUNTER — OFFICE VISIT (OUTPATIENT)
Dept: FAMILY MEDICINE | Facility: CLINIC | Age: 39
End: 2024-05-16

## 2024-05-16 VITALS
HEIGHT: 65 IN | HEART RATE: 88 BPM | OXYGEN SATURATION: 97 % | BODY MASS INDEX: 30.16 KG/M2 | WEIGHT: 181 LBS | SYSTOLIC BLOOD PRESSURE: 118 MMHG | DIASTOLIC BLOOD PRESSURE: 74 MMHG | TEMPERATURE: 98 F

## 2024-05-16 DIAGNOSIS — Z13.29 SCREENING FOR THYROID DISORDER: ICD-10-CM

## 2024-05-16 DIAGNOSIS — L30.1 DYSHIDROTIC ECZEMA: ICD-10-CM

## 2024-05-16 DIAGNOSIS — Z13.220 SCREENING FOR LIPID DISORDERS: ICD-10-CM

## 2024-05-16 DIAGNOSIS — G47.00 INSOMNIA, UNSPECIFIED TYPE: ICD-10-CM

## 2024-05-16 DIAGNOSIS — L70.0 ACNE VULGARIS: ICD-10-CM

## 2024-05-16 DIAGNOSIS — Z00.00 WELL WOMAN EXAM WITHOUT GYNECOLOGICAL EXAM: Primary | ICD-10-CM

## 2024-05-16 DIAGNOSIS — Z23 ENCOUNTER FOR IMMUNIZATION: ICD-10-CM

## 2024-05-16 DIAGNOSIS — Z30.44 ENCOUNTER FOR SURVEILLANCE OF VAGINAL RING HORMONAL CONTRACEPTIVE DEVICE: ICD-10-CM

## 2024-05-16 DIAGNOSIS — D75.839 THROMBOCYTOSIS, UNSPECIFIED: ICD-10-CM

## 2024-05-16 LAB
ALBUMIN SERPL-MCNC: 4.4 G/DL (ref 3.6–5.1)
ALP SERPL-CCNC: 48 U/L (ref 33–130)
ALT 1742-6: 17 U/L (ref 0–32)
AST 1920-8: 17 U/L (ref 0–35)
BILIRUB SERPL-MCNC: 0.4 MG/DL (ref 0.2–1.2)
BUN SERPL-MCNC: 12 MG/DL (ref 7–25)
BUN/CREATININE RATIO: 13 (ref 6–32)
CALCIUM SERPL-MCNC: 9.9 MG/DL (ref 8.6–10.3)
CHLORIDE SERPLBLD-SCNC: 104.1 MMOL/L (ref 98–110)
CHOLEST SERPL-MCNC: 151 MG/DL (ref 0–199)
CHOLEST/HDLC SERPL: 2 {RATIO} (ref 0–5)
CO2 SERPL-SCNC: 20.8 MMOL/L (ref 20–32)
CREAT SERPL-MCNC: 0.93 MG/DL (ref 0.6–1.3)
ERYTHROCYTE [DISTWIDTH] IN BLOOD BY AUTOMATED COUNT: 367 %
GLUCOSE SERPL-MCNC: 91 MG/DL (ref 60–99)
HCT VFR BLD AUTO: 38.7 % (ref 35–47)
HDLC SERPL-MCNC: 69 MG/DL (ref 40–150)
HEMOGLOBIN: 12.8 G/DL (ref 11.7–15.7)
LDLC SERPL CALC-MCNC: 54 MG/DL (ref 0–130)
MCH RBC QN AUTO: 30.2 PG (ref 26–33)
MCHC RBC AUTO-ENTMCNC: 33.1 G/DL (ref 31–36)
MCV RBC AUTO: 91.2 FL (ref 78–100)
PLATELET COUNT - QUEST: 367 10^9/L (ref 150–375)
POTASSIUM SERPL-SCNC: 4.42 MMOL/L (ref 3.5–5.3)
PROT SERPL-MCNC: 7.9 G/DL (ref 6.1–8.1)
RBC # BLD AUTO: 4.24 10*12/L (ref 3.8–5.2)
SODIUM SERPL-SCNC: 136.7 MMOL/L (ref 135–146)
TRIGL SERPL-MCNC: 138 MG/DL (ref 0–149)
WBC # BLD AUTO: 11.9 10*9/L (ref 4–11)

## 2024-05-16 PROCEDURE — 80061 LIPID PANEL: CPT

## 2024-05-16 PROCEDURE — 36415 COLL VENOUS BLD VENIPUNCTURE: CPT

## 2024-05-16 PROCEDURE — 85027 COMPLETE CBC AUTOMATED: CPT

## 2024-05-16 PROCEDURE — 99395 PREV VISIT EST AGE 18-39: CPT | Mod: 25

## 2024-05-16 PROCEDURE — 84443 ASSAY THYROID STIM HORMONE: CPT | Mod: 90

## 2024-05-16 PROCEDURE — 90471 IMMUNIZATION ADMIN: CPT

## 2024-05-16 PROCEDURE — 90715 TDAP VACCINE 7 YRS/> IM: CPT

## 2024-05-16 PROCEDURE — 80053 COMPREHEN METABOLIC PANEL: CPT

## 2024-05-16 RX ORDER — ETONOGESTREL AND ETHINYL ESTRADIOL VAGINAL RING .015; .12 MG/D; MG/D
1 RING VAGINAL
Qty: 3 EACH | Refills: 3 | Status: SHIPPED | OUTPATIENT
Start: 2024-05-16

## 2024-05-16 RX ORDER — SPIRONOLACTONE 50 MG/1
50 TABLET, FILM COATED ORAL DAILY
Qty: 90 TABLET | Refills: 0 | Status: SHIPPED | OUTPATIENT
Start: 2024-05-16

## 2024-05-16 NOTE — PATIENT INSTRUCTIONS
Thanks for coming in today Laisha.     I will send you a my chart with your lab results.     As discussed for the insomnia I would try taking a higher dose of Melatonin along with the extended release version or try taking Magnesium to see if this will you with your sleep.     For the eczema on you fingers, I would try use the Hydrocortisone cream consistently twice a day for two weeks then move to as needed. Let me know if this does not help.     Please let me know if you have any questions or concerns.

## 2024-05-16 NOTE — NURSING NOTE
Chief Complaint   Patient presents with    Physical     Fasting today     Sleep Problem     Struggling to fall asleep and stay asleep, her mind cannot shut off    Derm Problem     Small bumps between ring finger and middle finger on right hand     Pre-visit Screening:  Immunizations:  not up to date - tdap at pharmacy  Colonoscopy:  NA  Mammogram: NA  Asthma Action Test/Plan:  NA  PHQ9:  NA  GAD7:  NA  Questioned patient about current smoking habits Pt. has never smoked.  Ok to leave detailed message on voice mail for today's visit only Yes, phone # [phone]

## 2024-05-16 NOTE — PROGRESS NOTES
Preventive Care Visit  [facility]  Angela Davidson PA-C, Family Medicine  May 16, 2024       SUBJECTIVE:   Laisha is a 39 year old, presenting for the following:  Physical (Fasting today ), Sleep Problem (Struggling to fall asleep and stay asleep, her mind cannot shut off), and Derm Problem (Small bumps between ring finger and middle finger on right hand)      HPI    Laisha presents for her yearly physical.     Daily medications: Reviewed.     -Contraception: Doing well on Nuvaring which Laisha notes she uses for contraception and period regulation. LMP 05/02/24, has regular and manageable cycles. Laisha denies any history of HTN, smoking, migraines with auras, or personal/family history of blood clots.     -Acne: Taking Spironolactone 50 mg daily for hormonal acne which flares when she is on her cycle. She notes she will get cystic and very painful acne spots. She notes she does not think the Spironolactone has been helping much with her acne, would like to maybe do a trial of stopping this.     Concerns: Laisha has concerns regarding her sleep and a rash/bumps on her right hand.     -Sleep: Laisha notes for as long as she can remember she has had a tough time falling and staying asleep. She notes it is harder for her to fall asleep however once she wakes up in the middle of the night she can't get back to sleep. She notes she typically wakes up because she feels hot, she does sleep with a fan on. She denies ever being told that she snores. She has tried reading a book before bed and does not drink coffee in the evenings. She also notes she tried taking a low dose Melatonin which helped but then she still woke up in the middle of the night. She is not interested in taking prescription sleep medications.     -Rash: Laisha notes over the last couple of years, she has noticed little bumps on the sides of her fingers on her right hand. She notes she only notices them in the summer months and they do  get itchy. She has tried OTC Cortisone cream for a few days but this did not help.                                                                                                                                                  Past medical history and daily medications reviewed. Reviewed past medical history, surgical history, family history, and social history below.     Social history:  -Diet: Tries to eat a well balanced diet, chicken and beef for protein, good amount of vegetables, yogurt for calcium.   -Water: Drinks a good amount of water throughout the day, 1-2 cups of coffee.   -Exercise: Active, does a lot of walking.   -Sleep: Trouble falling and staying asleep.   -Daily vitamins: Multivitamin.   -Dentist: Twice a year.   -Eye doctor: Has not gone recently.   -Smoking: None.   -Alcohol: 3 drinks per week.   -LMP: 05/02/24, regular and manageable cycles.      Screenings:  -Immunizations: Due for TDAP. Encouraged COVID booster from local pharmacy.   -Lab work: CBC, CMP, lipid, TSH/T4.   -Pap smear: Up to date, due 04/2026.   -Mammogram: Starting at age 40.   -Colonoscopy: Starting at age 45.     Social History     Tobacco Use    Smoking status: Never     Passive exposure: Never    Smokeless tobacco: Never   Substance Use Topics    Alcohol use: Yes     Alcohol/week: 3.0 standard drinks of alcohol     Types: 3 Standard drinks or equivalent per week     Reviewed orders with patient.  Reviewed health maintenance and updated orders accordingly - Yes    Lab work is in process.    Breast Cancer Screening: Any new diagnosis of family breast, ovarian, or bowel cancer? No     Mammogram Screening - Patient under 40 years of age: Routine Mammogram Screening not recommended.     History of abnormal Pap smear: No - age 30- 64 PAP with HPV every 5 years recommended.     Reviewed and updated as needed this visit by clinical staff   Tobacco  Allergies   Problems  Med Hx  Surg Hx  Fam Hx  Soc Hx      Reviewed and  "updated as needed this visit by Provider   Tobacco     Med Hx  Surg Hx  Fam Hx  Soc Hx Sexual Activity        Review of Systems  CONSTITUTIONAL: NEGATIVE for fever, chills, change in weight  INTEGUMENTARY/SKIN: NEGATIVE for worrisome rashes, moles or lesions  EYES: NEGATIVE for vision changes or irritation  ENT/MOUTH: NEGATIVE for ear, mouth and throat problems  RESP: NEGATIVE for significant cough or SOB  BREAST: NEGATIVE for masses, tenderness or discharge  CV: NEGATIVE for chest pain, palpitations or peripheral edema  GI: NEGATIVE for nausea, abdominal pain, heartburn, or change in bowel habits  : NEGATIVE for frequency, dysuria, or hematuria  MUSCULOSKELETAL: NEGATIVE for significant arthralgias or myalgia  NEURO: NEGATIVE for weakness, dizziness or paresthesias  ENDOCRINE: NEGATIVE for temperature intolerance, skin/hair changes  HEME: NEGATIVE for bleeding problems  PSYCHIATRIC: NEGATIVE for changes in mood or affect    OBJECTIVE:   /74 (BP Location: Right arm, Patient Position: Sitting, Cuff Size: Adult Regular)   Pulse 88   Temp 98  F (36.7  C) (Temporal)   Ht 1.651 m (5' 5\")   Wt 82.1 kg (181 lb)   LMP 05/02/2024   SpO2 97%   BMI 30.12 kg/m     Estimated body mass index is 30.12 kg/m  as calculated from the following:    Height as of this encounter: 1.651 m (5' 5\").    Weight as of this encounter: 82.1 kg (181 lb).    Physical Exam  GENERAL: alert and no distress  EYES: Eyes grossly normal to inspection, PERRL and conjunctivae and sclerae normal  HENT: ear canals and TM's normal, nose and mouth without ulcers or lesions  NECK: no adenopathy, no asymmetry, masses, or scars  RESP: lungs clear to auscultation - no rales, rhonchi or wheezes  BREAST: declines, no concerns  CV: regular rate and rhythm, normal S1 S2, no S3 or S4, no murmur, click or rub, no peripheral edema  ABDOMEN: soft, nontender, no hepatosplenomegaly, no masses and bowel sounds normal   (female): declines, no " concerns  MS: no gross musculoskeletal defects noted, no edema  SKIN: tapioca-like bumps noted on sides of middle and ring finger of right hand, otherwise no suspicious lesions or rashes  NEURO: Normal strength and tone, mentation intact and speech normal  PSYCH: mentation appears normal, affect normal/bright    Lab work pending.     ASSESSMENT/PLAN:     Well woman exam without gynecological exam  - Laisha is doing well today. Encouraged healthy eating habits, daily exercise, reviewed screenings and immunizations, etc.     Encounter for surveillance of vaginal ring hormonal contraceptive device  - Doing well on combo OCP's, refills sent for one year.   - etonogestrel-ethinyl estradiol (NUVARING) 0.12-0.015 MG/24HR vaginal ring; Place 1 each vaginally every 21 days    Acne vulgaris  - Discussed with Laisha can do a trial of going off Spironolactone to see if any difference in acne. Recommend taking 1/2 tablet (25 mg) for the next couple of weeks and then stopping medication for 1-2 months to see if any difference. Sent in 3 month supply, she will let us know if needing more. CMP pending.   - VENOUS COLLECTION  - Comprehensive Metobolic Panel (BFP)  - spironolactone (ALDACTONE) 50 MG tablet; Take 1 tablet (50 mg) by mouth daily    Insomnia, unspecified type  - Reviewed sleep hygiene recommendations and more natural OTC sleep aids such as taking higher dose of Melatonin or trying Magnesium. She will let me know if this does not help.     Dyshidrotic eczema  - Discussed with Laisha she has dyshidrotic eczema on her right hand. Recommend she use OTC Cortisone cream BID x 2 weeks then use PRN, moisturize skin daily with non-scented lotion, and limit possible triggers (e.g., stress, heat, dryness, sweating, etc).     Screening for lipid disorders  - Labs pending, patient will be notified of results.   - VENOUS COLLECTION  - Lipid Panel (BFP)    Screening for thyroid disorder  - Labs pending, patient will be notified of  "results.   - VENOUS COLLECTION  - TSH WITH FREE T4 REFLEX ([facility])    Thrombocytosis, unspecified  - Labs pending, patient will be notified of results.   - VENOUS COLLECTION  - Hemogram Platelet (BFP)    Encounter for immunization  - TDAP updated.   - TDAP VACCINE (Adacel, Boostrix)  [[id number]]    Counseling  Reviewed preventive health counseling, as reflected in patient instructions       Regular exercise       Healthy diet/nutrition       Immunizations  Vaccinated for: TDAP         Alcohol Use       Contraception       Family planning       Osteoporosis prevention/bone health       Safe sex practices/STD prevention       Colorectal Cancer Screening    BMI  Estimated body mass index is 30.12 kg/m  as calculated from the following:    Height as of this encounter: 1.651 m (5' 5\").    Weight as of this encounter: 82.1 kg (181 lb).     Weight management plan: Discussed healthy diet and exercise guidelines    She reports that she has never smoked. She has never been exposed to tobacco smoke. She has never used smokeless tobacco.            Signed Electronically by: Angela Davidson PA-C  "

## 2024-05-17 LAB — TSH SERPL-ACNC: 1.53 MIU/L

## 2024-06-17 PROBLEM — Z76.89 HEALTH CARE HOME: Status: RESOLVED | Noted: 2021-02-02 | Resolved: 2024-06-17

## 2024-07-09 ENCOUNTER — TRANSFERRED RECORDS (OUTPATIENT)
Dept: FAMILY MEDICINE | Facility: CLINIC | Age: 39
End: 2024-07-09

## 2024-07-18 ENCOUNTER — TELEPHONE (OUTPATIENT)
Dept: FAMILY MEDICINE | Facility: CLINIC | Age: 39
End: 2024-07-18

## 2025-03-08 ENCOUNTER — HEALTH MAINTENANCE LETTER (OUTPATIENT)
Age: 40
End: 2025-03-08

## 2025-03-11 ENCOUNTER — TRANSFERRED RECORDS (OUTPATIENT)
Dept: FAMILY MEDICINE | Facility: CLINIC | Age: 40
End: 2025-03-11

## 2025-04-09 ENCOUNTER — MYC MEDICAL ADVICE (OUTPATIENT)
Dept: FAMILY MEDICINE | Facility: CLINIC | Age: 40
End: 2025-04-09

## 2025-04-09 DIAGNOSIS — Z30.44 ENCOUNTER FOR SURVEILLANCE OF VAGINAL RING HORMONAL CONTRACEPTIVE DEVICE: ICD-10-CM

## 2025-04-09 RX ORDER — ETONOGESTREL AND ETHINYL ESTRADIOL VAGINAL RING .015; .12 MG/D; MG/D
1 RING VAGINAL
Qty: 1 EACH | Refills: 0 | Status: SHIPPED | OUTPATIENT
Start: 2025-04-09

## 2025-04-09 NOTE — TELEPHONE ENCOUNTER
Patient is scheduled for physical on 5/28 but needs extension of  Pending Prescriptions:                       Disp   Refills    etonogestrel-ethinyl estradiol (NUVARING)*1 each 0            Sig: Place 1 each vaginally every 21 days.    Routing to Muna to send in.

## 2025-04-29 DIAGNOSIS — Z30.44 ENCOUNTER FOR SURVEILLANCE OF VAGINAL RING HORMONAL CONTRACEPTIVE DEVICE: ICD-10-CM

## 2025-04-29 RX ORDER — ETONOGESTREL AND ETHINYL ESTRADIOL .12; .015 MG/D; MG/D
RING VAGINAL
Qty: 1 EACH | Refills: 0 | Status: SHIPPED | OUTPATIENT
Start: 2025-04-29

## 2025-04-29 NOTE — TELEPHONE ENCOUNTER
Pt scheduled CPX for 05/28.    Lynette Condon is requesting a refill of:    Pending Prescriptions:                       Disp   Refills    etonogestrel-ethinyl estradiol (ELURYNG) *1 each 0            Sig: PLACE 1 EACH VAGINALLY EVERY 21 DAYS.

## 2025-05-28 ENCOUNTER — OFFICE VISIT (OUTPATIENT)
Dept: FAMILY MEDICINE | Facility: CLINIC | Age: 40
End: 2025-05-28

## 2025-05-28 VITALS
SYSTOLIC BLOOD PRESSURE: 118 MMHG | OXYGEN SATURATION: 97 % | HEART RATE: 81 BPM | BODY MASS INDEX: 29.79 KG/M2 | DIASTOLIC BLOOD PRESSURE: 74 MMHG | HEIGHT: 65 IN | TEMPERATURE: 97 F | WEIGHT: 178.8 LBS

## 2025-05-28 DIAGNOSIS — Z12.31 ENCOUNTER FOR SCREENING MAMMOGRAM FOR BREAST CANCER: ICD-10-CM

## 2025-05-28 DIAGNOSIS — Z30.011 OCP (ORAL CONTRACEPTIVE PILLS) INITIATION: ICD-10-CM

## 2025-05-28 DIAGNOSIS — G43.E09 CHRONIC MIGRAINE WITH AURA WITHOUT STATUS MIGRAINOSUS, NOT INTRACTABLE: ICD-10-CM

## 2025-05-28 DIAGNOSIS — Z13.220 SCREENING FOR LIPID DISORDERS: ICD-10-CM

## 2025-05-28 DIAGNOSIS — Z01.419 ENCOUNTER FOR GYNECOLOGICAL EXAMINATION WITHOUT ABNORMAL FINDING: Primary | ICD-10-CM

## 2025-05-28 DIAGNOSIS — Z13.228 SCREENING FOR METABOLIC DISORDER: ICD-10-CM

## 2025-05-28 DIAGNOSIS — Z13.0 SCREENING FOR BLOOD DISEASE: ICD-10-CM

## 2025-05-28 LAB
BUN SERPL-MCNC: 12 MG/DL (ref 7–25)
BUN/CREATININE RATIO: 14 (ref 6–32)
CALCIUM SERPL-MCNC: 9.3 MG/DL (ref 8.6–10.3)
CHLORIDE SERPLBLD-SCNC: 102.3 MMOL/L (ref 98–110)
CHOLEST SERPL-MCNC: 172 MG/DL (ref 0–199)
CHOLEST/HDLC SERPL: 3 {RATIO} (ref 0–5)
CO2 SERPL-SCNC: 22.5 MMOL/L (ref 20–32)
CREAT SERPL-MCNC: 0.88 MG/DL (ref 0.6–1.3)
ERYTHROCYTE [DISTWIDTH] IN BLOOD BY AUTOMATED COUNT: 12.3 %
GLUCOSE SERPL-MCNC: 95 MG/DL (ref 60–99)
HCT VFR BLD AUTO: 40 % (ref 35–47)
HDLC SERPL-MCNC: 60 MG/DL (ref 40–150)
HEMOGLOBIN: 12.8 G/DL (ref 11.7–15.7)
LDLC SERPL CALC-MCNC: 54 MG/DL (ref 0–129)
MCH RBC QN AUTO: 29.3 PG (ref 26–33)
MCHC RBC AUTO-ENTMCNC: 32 G/DL (ref 31–36)
MCV RBC AUTO: 91.5 FL (ref 78–100)
PLATELET COUNT - QUEST: 398 10^9/L (ref 150–375)
POTASSIUM SERPL-SCNC: 3.94 MMOL/L (ref 3.5–5.3)
RBC # BLD AUTO: 4.37 10*12/L (ref 3.8–5.2)
SODIUM SERPL-SCNC: 137.2 MMOL/L (ref 135–146)
TRIGL SERPL-MCNC: 292 MG/DL (ref 0–149)
WBC # BLD AUTO: 11.1 10*9/L (ref 4–11)

## 2025-05-28 PROCEDURE — 80061 LIPID PANEL: CPT | Performed by: PHYSICIAN ASSISTANT

## 2025-05-28 PROCEDURE — 36415 COLL VENOUS BLD VENIPUNCTURE: CPT | Performed by: PHYSICIAN ASSISTANT

## 2025-05-28 PROCEDURE — 99459 PELVIC EXAMINATION: CPT | Performed by: PHYSICIAN ASSISTANT

## 2025-05-28 PROCEDURE — 99396 PREV VISIT EST AGE 40-64: CPT | Performed by: PHYSICIAN ASSISTANT

## 2025-05-28 PROCEDURE — 80048 BASIC METABOLIC PNL TOTAL CA: CPT | Performed by: PHYSICIAN ASSISTANT

## 2025-05-28 PROCEDURE — 85027 COMPLETE CBC AUTOMATED: CPT | Performed by: PHYSICIAN ASSISTANT

## 2025-05-28 RX ORDER — CLINDAMYCIN PHOSPHATE 10 UG/ML
LOTION TOPICAL
COMMUNITY
Start: 2025-05-13

## 2025-05-28 RX ORDER — ACETAMINOPHEN AND CODEINE PHOSPHATE 120; 12 MG/5ML; MG/5ML
0.35 SOLUTION ORAL DAILY
Qty: 84 TABLET | Refills: 4 | Status: SHIPPED | OUTPATIENT
Start: 2025-05-28

## 2025-05-28 RX ORDER — SPIRONOLACTONE 100 MG/1
1 TABLET, FILM COATED ORAL
COMMUNITY
Start: 2025-03-11

## 2025-05-28 RX ORDER — SUMATRIPTAN 50 MG/1
50 TABLET, FILM COATED ORAL
Qty: 18 TABLET | Refills: 1 | Status: SHIPPED | OUTPATIENT
Start: 2025-05-28

## 2025-05-28 NOTE — NURSING NOTE
Chief Complaint   Patient presents with    Physical     CPX fasting, discuss birth control options      Pre-visit Screening:  Immunizations:  up to date  Colonoscopy:  na  Mammogram: is due and ordered today  Asthma Action Test/Plan:  na  PHQ9:  phq2  given  GAD7:  na  Questioned patient about current smoking habits Pt. has never smoked.  Ok to leave detailed message on voice mail for today's visit only yes, phone # [phone] (home)

## 2025-05-28 NOTE — PROGRESS NOTES
Chief Complaint:  Physical Exam    SUBJECTIVE:   Lynette Condon is a 40 year old female presents for routine health maintenance.    Current concerns:   Contraception:  Has been on some form of since approximately 18/19 years old. Currently using vaginal ring. She is not currently sexually active for a few months. Has not noticed any irregular periods. No obvious hot flashes. Would be interested in changing to the pill.   1. Do you or anyone in your family have a history of blood clots? No  2. Do you have a history of migraine with aura?  Yes. Pt herself gets occasional migraine with aura. Takes sumatriptan as needed.   3. Do you smoke?  No  4. Do you have a history of hypertension?  No    Acne:  Takes spironolactone 100 mg daily for acne. Stable on this. No recent lab work for this through dermatology.     Migraines:  Does have history of migraines with auras. Rarely. Last one was approximately 1 year ago. Goes temporarily blind prior to pain onset. Would like refill of sumatriptan that she uses as needed. Works well without side effects.     Menses are regular    Patient's last menstrual period was 05/01/2025 (approximate).    Was last Pap smear normal: Yes  Due for mammogram:  Yes    Body mass index is 29.75 kg/m .    Present exercise habits:  3-4 times/week  Present dietary habits:  eats regular meals and follows a balanced nutrition diet    Calcium intake: 2-3 servings daily  Vit D intake: is taking supplement    Is the patient a smoker? No  If yes, smoking cessation advised and counseling provided.     Cardiovascular risk factors: none    Over the past few weeks, have you felt down or depressed? Little interest or pleasure in doing things? No concerns    If in a relationship are there any Domestic violence concern: No    Last dental appointment:  this year  Last optical appointment:  2 years ago    Was the patient born between [birth year] and has not had Hep C testing?  Has not been tested, declines  testing    I have reviewed the following histories: Past Medical History, Past Surgical History, Social History, Family History, Problem List, Medication List and Allergies    No past medical history on file.  Family History   Problem Relation Age of Onset    Hypertension Mother     Hypertension Father     Hypertension Brother     Breast Cancer Maternal Grandmother         70s    Prostate Cancer Maternal Grandfather     Breast Cancer Paternal Grandmother         70s    Breast Cancer Maternal Aunt     Ovarian Cancer No family hx of     Colon Cancer No family hx of      Social History     Socioeconomic History    Marital status: Single     Spouse name: Not on file    Number of children: Not on file    Years of education: Not on file    Highest education level: Not on file   Occupational History    Not on file   Tobacco Use    Smoking status: Never     Passive exposure: Never    Smokeless tobacco: Never   Vaping Use    Vaping status: Never Used   Substance and Sexual Activity    Alcohol use: Yes     Alcohol/week: 4.0 - 5.0 standard drinks of alcohol     Types: 4 - 5 Standard drinks or equivalent per week     Comment: weekends    Drug use: Never    Sexual activity: Not Currently     Partners: Male   Other Topics Concern    Not on file   Social History Narrative    Not on file     Social Drivers of Health     Financial Resource Strain: Not on file   Food Insecurity: Not on file   Transportation Needs: Not on file   Physical Activity: Not on file   Stress: Not on file   Social Connections: Not on file   Interpersonal Safety: Not on file   Housing Stability: Not on file     Past Surgical History:   Procedure Laterality Date    LASIK Bilateral     age 21    OTHER SURGICAL HISTORY      egg harvesting x 2 for donation    PHOTOREFRACTIVE KERATECTOMY Bilateral     age 26    FL EXCISION OF GANGLION CYST FOREARM Right 02/16/2021    [facility]       ROS:  E/M: NEGATIVE for ear, nose, mouth and throat problems  R: NEGATIVE for  "significant/chronic cough or SOB  CV: NEGATIVE for chest pain or palpitations  GI: NEGATIVE for abdominal pain, chronic diarrhea or constipation  :  NEGATIVE for dysuria, hematuria or vaginal discharge. No sexual health concerns.       Current Outpatient Medications   Medication Sig Dispense Refill    cetirizine (ZYRTEC) 10 MG tablet Take 10 mg by mouth daily      clindamycin (CLEOCIN T) 1 % external lotion APPLY TO AFFECTED AREAS OF ACNE EVERY MORNING.      ELURYNG 0.12-0.015 MG/24HR vaginal ring PLACE 1 EACH VAGINALLY EVERY 21 DAYS. 1 each 0    Multiple Vitamins-Minerals (WOMENS MULTIVITAMIN) TABS       norethindrone (MICRONOR) 0.35 MG tablet Take 1 tablet (0.35 mg) by mouth daily. 84 tablet 4    spironolactone (ALDACTONE) 100 MG tablet Take 1 tablet by mouth daily at 2 pm.      SUMAtriptan (IMITREX) 50 MG tablet Take 1 tablet (50 mg) by mouth at onset of headache for migraine. May repeat in 2 hours. Max 4 tablets/24 hours. 18 tablet 1     No current facility-administered medications for this visit.       Patient Active Problem List    Diagnosis Date Noted    Acne vulgaris 05/15/2024     Priority: Medium    ACP (advance care planning) 02/02/2021     Priority: Low         OBJECTIVE:  /74 (BP Location: Left arm, Patient Position: Sitting, Cuff Size: Adult Regular)   Pulse 81   Temp 97  F (36.1  C) (Temporal)   Ht 1.651 m (5' 5\")   Wt 81.1 kg (178 lb 12.8 oz)   LMP 05/01/2025 (Approximate)   SpO2 97%   BMI 29.75 kg/m          General: 40 year old female who appears her stated age. Vital signs noted  Head: Normocephalic  Eyes: pupils equal round reactive to light and accomodation, extra ocular movements intact  Ears: external canals and TMs free of abnormalities  Nose: patent, without mucosal abnormalities  Mouth and throat: without erythema or lesions of the mucosa  Neck: supple, without adenopathy or thyromegaly  Lungs: clear to auscultation, no wheezing or crackles  Breasts: skin without rash, no " dominant mass, no nipple discharge, or axillary adenopathy  Cv: regular rate and rhythm, normal s1 and s2 without murmur or click  Abd: soft, non-tender, no masses, no hepatomegaly or splenomegaly.   (female): normal female external genitalia, normal urethral meatus, vaginal mucosa, normal cervix/adnexa/uterus without masses or discharge.  Ms: normal muscle tone & symmetry  Skin: clear to inspection and with no palpable abnormalities.  Neuro: sensation and motor function grossly intact; cranial nerves without obvious abnormalities.    ASSESSMENT/PLAN:    Encounter for gynecological examination without abnormal finding  Laisha is doing well today. Will update fasting labs and send MyChart. Agreed to refill sumatriptan to use as needed. Discussed blood clot risk with combination contraception with migraines with aura. Agreed to change to progesterone only pill. Warned that this may not control cycle as well. Needs to be taken at same time for contraception purposes. Would refer to OBGYN as needed if pt would like to consider IUD/Nexplanon. Ordered initial mammogram and recommended continue annually.   - [place] PELVIC EXAMINATION    Encounter for screening mammogram for breast cancer  - MA Screening Bilateral w/ Mick  - Radiology Referral (Affiliate Use Only)    Chronic migraine with aura without status migrainosus, not intractable  - SUMAtriptan (IMITREX) 50 MG tablet; Take 1 tablet (50 mg) by mouth at onset of headache for migraine. May repeat in 2 hours. Max 4 tablets/24 hours.    OCP (oral contraceptive pills) initiation  - norethindrone (MICRONOR) 0.35 MG tablet; Take 1 tablet (0.35 mg) by mouth daily.    Screening for lipid disorders  - VENOUS COLLECTION  - Lipid Panel ([facility])    Screening for metabolic disorder  - VENOUS COLLECTION  - Basic Metabolic Panel ([facility])    Screening for blood disease  - VENOUS COLLECTION  - Hemogram Platelet ([facility])     reports that she has never smoked. She has never been exposed to tobacco  "smoke. She has never used smokeless tobacco.      Estimated body mass index is 29.75 kg/m  as calculated from the following:    Height as of this encounter: 1.651 m (5' 5\").    Weight as of this encounter: 81.1 kg (178 lb 12.8 oz).  Weight management plan: Discussed healthy diet and exercise guidelines      Labs pending:      Fasting glucose      Fasting lipids  Meds Suggested:      Vitamin D       Calcium  Tests Recommended:      Regular Dental Examinations         Mammogram yearly  Behavior Modifications:       Cardiovascular exercise 3 times per week--enough to get your Target Heart rate  Other recommendations:     BMI noted and discussed      Regular breast exam     Encouraged My Chart    Counseling Resources:  ATP IV Guidelines  Pooled Cohorts Equation Calculator  Breast Cancer Risk Calculator  FRAX Risk Assessment  ICSI Preventive Guidelines  Dietary Guidelines for Americans, 2010  USDA's MyPlate            Muna Paul PA-C  5/28/2025    "

## 2025-05-29 ENCOUNTER — RESULTS FOLLOW-UP (OUTPATIENT)
Dept: FAMILY MEDICINE | Facility: CLINIC | Age: 40
End: 2025-05-29

## 2025-06-09 LAB — MAMMOGRAM: NORMAL
